# Patient Record
Sex: FEMALE | Race: WHITE | NOT HISPANIC OR LATINO | Employment: FULL TIME | URBAN - METROPOLITAN AREA
[De-identification: names, ages, dates, MRNs, and addresses within clinical notes are randomized per-mention and may not be internally consistent; named-entity substitution may affect disease eponyms.]

---

## 2018-01-17 NOTE — MISCELLANEOUS
Message   Recorded as Task   Date: 09/12/2016 08:29 AM, Created By: Nessa Fox   Task Name: Go to Result   Assigned To: [facility]   Regarding Patient: Hilda Felix, Status: In Progress   Comment:    Viktoria Solisdon - 12 Sep 2016 8:29 AM     TASK CREATED  Patient with essentially normal tissue, but showing possible signs of infection  She should go on Keflex 250 mg 4 times a day for 1 week  In the meantime, she also may consider having an NovaSure ablation  Nessa Cerrato - 12 Sep 2016 12:47 PM     TASK IN PROGRESS        Active Problems    1  Denied: History of Breast self examination education, encounter for   2  Encounter for screening for malignant neoplasm of cervix (V76 2) (Z12 4)   3  Encounter for screening mammogram for malignant neoplasm of breast (V76 12)   (Z12 31)   4  Infection (136 9) (B99 9)   5  Irregular menses (626 4) (N92 6)   6  Mammogram Right Breast Microcalcifications (793 81)   7  Screening for human papillomavirus (HPV) (V73 81) (Z11 51)    Allergies    1  Erythromycin Base TABS   2  Levaquin TABS   3  Tetracyclines    Plan  Infection    · Cephalexin 250 MG Oral Capsule;  Take 1 capsule 4 times a day for 7 days    Signatures   Electronically signed by : Stephen Eason, ; Sep 15 2016 11:28AM EST                       (Author)

## 2018-03-26 ENCOUNTER — TELEPHONE (OUTPATIENT)
Dept: OBGYN CLINIC | Facility: CLINIC | Age: 48
End: 2018-03-26

## 2018-03-27 ENCOUNTER — OFFICE VISIT (OUTPATIENT)
Dept: OBGYN CLINIC | Facility: CLINIC | Age: 48
End: 2018-03-27
Payer: COMMERCIAL

## 2018-03-27 ENCOUNTER — TELEPHONE (OUTPATIENT)
Dept: OBGYN CLINIC | Facility: CLINIC | Age: 48
End: 2018-03-27

## 2018-03-27 VITALS
HEIGHT: 61 IN | DIASTOLIC BLOOD PRESSURE: 58 MMHG | BODY MASS INDEX: 25.3 KG/M2 | SYSTOLIC BLOOD PRESSURE: 106 MMHG | WEIGHT: 134 LBS

## 2018-03-27 DIAGNOSIS — N92.0 MENORRHAGIA WITH REGULAR CYCLE: Primary | ICD-10-CM

## 2018-03-27 DIAGNOSIS — Z12.31 ENCOUNTER FOR SCREENING MAMMOGRAM FOR BREAST CANCER: ICD-10-CM

## 2018-03-27 PROCEDURE — 99213 OFFICE O/P EST LOW 20 MIN: CPT | Performed by: OBSTETRICS & GYNECOLOGY

## 2018-03-27 PROCEDURE — 58100 BIOPSY OF UTERUS LINING: CPT | Performed by: OBSTETRICS & GYNECOLOGY

## 2018-03-27 NOTE — PROGRESS NOTES
Endometrial biopsy  Date/Time: 3/27/2018 12:26 PM  Performed by: Tessie Fonseca by: Cecelia Sepulveda     Consent:     Consent obtained:  Verbal    Consent given by:  Patient    Procedural risks discussed:  Bleeding, infection and possible continued pain    Patient questions answered: yes      Patient agrees, verbalizes understanding, and wants to proceed: yes    Indication:     Indications: Other disorder of menstruation and other abnormal bleeding from female genital tract    Pre-procedure:     Pre-procedure timeout performed: yes    Procedure:     Procedure: endometrial biopsy with Pipelle      A bivalve speculum was placed in the vagina: yes      Cervix cleaned and prepped: yes      A paracervical block was performed: no      An intracervical block was performed: no      The cervix was dilated: no      Uterus sounded: yes      Uterus sound depth (cm):  8    Specimen collected: specimen collected and sent to pathology      Patient tolerated procedure well with no complications: yes       Cayetano Reyna presents with continuous bleeding off and on for the entire month of March  She takes no new medications, denies any pelvic pain, and has no history of polyps or fibroids  The patient consented to an endometrial sampling as performed above  Afterwards an exam revealed an anteverted normal size uterus that is mobile and nontender with no adnexal masses  Assessment/plan:  Menorrhagia with regular cycles  Patient to be scheduled for sonohysterogram and further recommendations based on this and the endometrial sampling

## 2018-04-03 ENCOUNTER — ULTRASOUND (OUTPATIENT)
Dept: OBGYN CLINIC | Facility: CLINIC | Age: 48
End: 2018-04-03
Payer: COMMERCIAL

## 2018-04-03 ENCOUNTER — PROCEDURE VISIT (OUTPATIENT)
Dept: OBGYN CLINIC | Facility: CLINIC | Age: 48
End: 2018-04-03
Payer: COMMERCIAL

## 2018-04-03 VITALS
BODY MASS INDEX: 26.39 KG/M2 | WEIGHT: 139.8 LBS | SYSTOLIC BLOOD PRESSURE: 118 MMHG | HEIGHT: 61 IN | DIASTOLIC BLOOD PRESSURE: 80 MMHG

## 2018-04-03 DIAGNOSIS — N92.0 MENORRHAGIA WITH REGULAR CYCLE: Primary | ICD-10-CM

## 2018-04-03 DIAGNOSIS — N92.6 IRREGULAR BLEEDING: Primary | ICD-10-CM

## 2018-04-03 LAB — SL AMB POCT URINE HCG: NORMAL

## 2018-04-03 PROCEDURE — 76831 ECHO EXAM UTERUS: CPT

## 2018-04-03 PROCEDURE — 58340 CATHETER FOR HYSTEROGRAPHY: CPT

## 2018-04-03 PROCEDURE — 81025 URINE PREGNANCY TEST: CPT

## 2018-04-03 PROCEDURE — 76830 TRANSVAGINAL US NON-OB: CPT

## 2018-04-03 PROCEDURE — 99213 OFFICE O/P EST LOW 20 MIN: CPT

## 2018-04-03 NOTE — PROGRESS NOTES
Sylvia Angelo is seen today following an uneventful sonohysterogram because of irregular bleeding  She did have an endometrial sampling last week, results are still unavailable  She and I did discuss that the findings on ultrasound today were within normal limits, and that barring an abnormality of the biopsy, she had several options available to her  The options included but not limited to observation, medications, or ablation  The patient also was reminded she is overdue for a Pap smear and annual visit and she will schedule this at this time  For now she will probably elect to observe her cycles and she will be notified of the biopsy results when available  I have spent 17 minutes with the patient today in which greater than 50% of this time was spent in counseling/coordination of care regarding irregular bleeding

## 2018-04-03 NOTE — PROGRESS NOTES
AMB US Pelvic Non OB  Date/Time: 4/3/2018 9:21 AM  Performed by: Santos Mckoy by: Robert Pritchard     Procedure details:     Indications: non-obstetric vaginal bleeding      Technique:  Transvaginal US, Non-OB    Position: lithotomy exam    Uterine findings:     Diameter (mm):  59    Length (mm):  111    Width (mm):  70    Endometrial stripe: identified      Endometrium thickness (mm):  8 5  Left ovary findings:     Left ovary:  Visualized    Diameter (mm):  19 1    Length (mm):  29 7    Width (mm):  19 1  Right ovary findings:     Right ovary:  Visualized    Diameter (mm):  23 3    Length (mm):  41 6    Width (mm):  29 5  Other findings:     Free pelvic fluid: not identified      Free peritoneal fluid: not identified    Post-Procedure Details:     Impression:  Anteverted uterus and bilateral ovaries appear within normal limits  The right ovary demonstrates a 7 4WD follicle  No free fluid  Tolerance:   Tolerated well, no immediate complications    Complications: no complications    Sonohysterogram  Date/Time: 4/3/2018 9:25 AM  Performed by: Obdulio Chaudhary by: Robert Pritchard     Consent:     Consent obtained:  Written    Consent given by:  Patient    Patient questions answered: yes      Patient agrees, verbalizes understanding, and wants to proceed: yes    Pre-procedure:     Prepped with: Betadine    Procedure:     Catheter inserted: yes      Uterine cavity distended with saline: yes    Post-procedure:     No complications: no      Post procedure instructions given to patient: yes      Patient tolerated procedure well with no complications: yes    Comments:      Sonohysterogram demonstrates a small anterior calcification within the endometrium, otherwise a smooth appearing endometrium

## 2018-04-04 LAB
PATH REPORT.SITE OF ORIGIN SPEC: NORMAL
PAYMENT PROCEDURE: NORMAL
SL AMB .: NORMAL

## 2018-04-18 ENCOUNTER — ANNUAL EXAM (OUTPATIENT)
Dept: OBGYN CLINIC | Facility: CLINIC | Age: 48
End: 2018-04-18
Payer: COMMERCIAL

## 2018-04-18 VITALS — HEIGHT: 61 IN | WEIGHT: 141.6 LBS | BODY MASS INDEX: 26.73 KG/M2

## 2018-04-18 DIAGNOSIS — Z11.51 SCREENING FOR HPV (HUMAN PAPILLOMAVIRUS): ICD-10-CM

## 2018-04-18 DIAGNOSIS — Z12.4 CERVICAL CANCER SCREENING: ICD-10-CM

## 2018-04-18 DIAGNOSIS — Z01.419 ENCOUNTER FOR GYNECOLOGICAL EXAMINATION WITHOUT ABNORMAL FINDING: Primary | ICD-10-CM

## 2018-04-18 DIAGNOSIS — N92.6 IRREGULAR MENSES: ICD-10-CM

## 2018-04-18 PROCEDURE — 99396 PREV VISIT EST AGE 40-64: CPT | Performed by: OBSTETRICS & GYNECOLOGY

## 2018-04-18 PROCEDURE — G0145 SCR C/V CYTO,THINLAYER,RESCR: HCPCS | Performed by: OBSTETRICS & GYNECOLOGY

## 2018-04-18 PROCEDURE — 87624 HPV HI-RISK TYP POOLED RSLT: CPT | Performed by: OBSTETRICS & GYNECOLOGY

## 2018-04-18 NOTE — PROGRESS NOTES
CC:  Annual exam    HPI: Anna Nichols presents for routine annual visit  The patient is aware of her biopsy results from 2 weeks ago  At present she prefers to simply observe her bleeding, but will call if she has any further questions  Past Medical History:  History reviewed  No pertinent past medical history  Past Surgical History:  Past Surgical History:   Procedure Laterality Date    CARPAL TUNNEL RELEASE  2005    MANDIBLE FRACTURE SURGERY      TOE SURGERY         Past OB/Gyn History:  Menstrual cycles very irregular  Denies any history of sexually transmitted infection  No history of abnormal pap smears  Her last pap smear was over 3 years ago  ALLERGIES:   Allergies   Allergen Reactions    Erythromycin Hives    Levofloxacin     Other      Sneeezing,     Pollen Extract      sneezinig    Tetracycline Other (See Comments)       MEDS: No current outpatient prescriptions on file  Family History:  Family History   Problem Relation Age of Onset    No Known Problems Mother     Cancer Father        Social History:  Social History     Social History    Marital status:      Spouse name: N/A    Number of children: N/A    Years of education: N/A     Occupational History    Not on file  Social History Main Topics    Smoking status: Light Tobacco Smoker    Smokeless tobacco: Never Used    Alcohol use Yes      Comment: SOCIAL DRINKER    Drug use: No    Sexual activity: Yes     Partners: Male     Birth control/ protection: None     Other Topics Concern    Not on file     Social History Narrative    No narrative on file         Review of Systems:  Skin: No rashes or discolorations of any concern  RESP: Denies SOB, no cough  CV: Denies chest pain or palpitations  Breasts: Denies masses, pain, skin changes and nipple discharge  GI: Denies abdominal pain, heartburn, nausea, vomiting, changes in bowel habits     : Denies dysuria, frequency, CVA tenderness, incontinence and hematuria  Genitalia: Denies abnormal vaginal discharge, external lesions, rashes, pelvic pain, pressure, abnormal bleeding  Rectal:  Denies pain, bleeding, hemorrhoids,    Physical Exam:  Ht 5' 1" (1 549 m)   Wt 64 2 kg (141 lb 9 6 oz)   LMP 03/06/2018 (Exact Date)   Breastfeeding? No   BMI 26 76 kg/m²    Gen: The patient was alert and oriented x3, pleasant well-appearing female in no acute distress  Neck:  Unremarkable, no anterior or posterior lymphadenopathy, no thyromegaly  Breasts: Symmetric  No dominant, discrete, fixed  or suspicious masses are noted  No skin or nipple changes  No palpable axillary nodes  Abd:  Soft, nontender, nondistended, no masses or organomegaly  Back:  No CVA tenderness, no tenderness to palpation along spine  Pelvic  Normal appearing external female genitalia, no visible lesions, no rashes  Vagina is free of discharge, normal vaginal epithelium, no abnormal  lesions, no evidence of prolapse anteriorly or posteriorly  Normal appearing cervix, mobile and nontender  A thin prep pap smear was obtained  Uterus is normal size, mobile and, nontender  No palpable adnexal masses or tenderness  No anoperineal lesions  Rectal:  No masses, tenderness, hemorrhoids, or obvious blood  Skin:  No concerning lesions  Extremeties: No edema      Assessment & Plan:   1  Routine annual exam      RTO one year orPRN  2  Encounter for screening mammogram, referral for mammogram given to patient  3   Irregular bleeding, patient will observe for now but will call with any further questions

## 2018-04-20 LAB — HPV RRNA GENITAL QL NAA+PROBE: NORMAL

## 2018-04-23 LAB
LAB AP GYN PRIMARY INTERPRETATION: NORMAL
LAB AP LMP: NORMAL
Lab: NORMAL

## 2019-05-20 ENCOUNTER — ANNUAL EXAM (OUTPATIENT)
Dept: OBGYN CLINIC | Facility: CLINIC | Age: 49
End: 2019-05-20
Payer: COMMERCIAL

## 2019-05-20 VITALS
BODY MASS INDEX: 26.32 KG/M2 | WEIGHT: 139.4 LBS | SYSTOLIC BLOOD PRESSURE: 106 MMHG | HEIGHT: 61 IN | DIASTOLIC BLOOD PRESSURE: 78 MMHG

## 2019-05-20 DIAGNOSIS — Z12.31 BREAST CANCER SCREENING BY MAMMOGRAM: Primary | ICD-10-CM

## 2019-05-20 PROCEDURE — 99396 PREV VISIT EST AGE 40-64: CPT | Performed by: OBSTETRICS & GYNECOLOGY

## 2019-06-04 ENCOUNTER — TELEPHONE (OUTPATIENT)
Dept: OBGYN CLINIC | Facility: CLINIC | Age: 49
End: 2019-06-04

## 2019-11-18 ENCOUNTER — OFFICE VISIT (OUTPATIENT)
Dept: PODIATRY | Facility: CLINIC | Age: 49
End: 2019-11-18
Payer: COMMERCIAL

## 2019-11-18 VITALS
HEART RATE: 78 BPM | BODY MASS INDEX: 26.43 KG/M2 | HEIGHT: 61 IN | SYSTOLIC BLOOD PRESSURE: 124 MMHG | WEIGHT: 140 LBS | DIASTOLIC BLOOD PRESSURE: 80 MMHG

## 2019-11-18 DIAGNOSIS — M20.21 HALLUX RIGIDUS OF RIGHT FOOT: Primary | ICD-10-CM

## 2019-11-18 DIAGNOSIS — M79.671 RIGHT FOOT PAIN: ICD-10-CM

## 2019-11-18 PROCEDURE — 20550 NJX 1 TENDON SHEATH/LIGAMENT: CPT

## 2019-11-18 PROCEDURE — 99203 OFFICE O/P NEW LOW 30 MIN: CPT

## 2019-11-18 RX ORDER — MELOXICAM 15 MG/1
15 TABLET ORAL DAILY
Qty: 30 TABLET | Refills: 0 | Status: SHIPPED | OUTPATIENT
Start: 2019-11-18 | End: 2020-12-18 | Stop reason: HOSPADM

## 2019-11-18 RX ORDER — LIDOCAINE HYDROCHLORIDE 10 MG/ML
1 INJECTION, SOLUTION INFILTRATION; PERINEURAL ONCE
Status: COMPLETED | OUTPATIENT
Start: 2019-11-18 | End: 2019-11-18

## 2019-11-18 RX ORDER — TRIAMCINOLONE ACETONIDE 40 MG/ML
20 INJECTION, SUSPENSION INTRA-ARTICULAR; INTRAMUSCULAR ONCE
Status: COMPLETED | OUTPATIENT
Start: 2019-11-18 | End: 2019-11-18

## 2019-11-18 RX ADMIN — TRIAMCINOLONE ACETONIDE 20 MG: 40 INJECTION, SUSPENSION INTRA-ARTICULAR; INTRAMUSCULAR at 15:49

## 2019-11-18 RX ADMIN — LIDOCAINE HYDROCHLORIDE 1 ML: 10 INJECTION, SOLUTION INFILTRATION; PERINEURAL at 15:49

## 2019-11-18 NOTE — PROGRESS NOTES
Assessment/Plan:    X-rays, two views right foot revealed mild osteoarthritis of the great toe joint  There is slight narrowing of the joint space and a small dorsal spur on the 1st metatarsal   Explained to patient that she is dealing with hallux rigidus  The x-rays finding are that of a mild condition  Discussed conservative and surgical treatment options recommending conservative care for now  Injected right foot distal to tibial sesamoid with 0 5 cc Kenalog 40 and 1 cc 1% xylocaine  Patient placed on meloxicam 15 mg daily for 30 days  She is rescheduled for 4 weeks  No problem-specific Assessment & Plan notes found for this encounter  Diagnoses and all orders for this visit:    Hallux rigidus of right foot  -     XR foot 2 vw right; Future  -     meloxicam (MOBIC) 15 mg tablet; Take 1 tablet (15 mg total) by mouth daily  -     lidocaine (XYLOCAINE) 1 % injection 1 mL  -     triamcinolone acetonide (KENALOG-40) 40 mg/mL injection 20 mg    Right foot pain          Subjective:      Patient ID: Ashok Agrawal is a 57 y o  female  HPI     Patient presents with pain in her right great toe joint  Patient states that this joint has become painful over the past few months  Patient had similar pain in the left great toe joint in 2012 that necessitated an implant arthroplasty  Patient states that the right great toe throbs when walking  Chief area of pain is on the undersurface of the right great toe slightly distal to the tibial sesamoid  Patient has been wearing a gel pad on the 2nd toe to relieve discomfort  This helps to a modest degree  The following portions of the patient's history were reviewed and updated as appropriate: allergies, current medications, past family history, past medical history, past social history, past surgical history and problem list     Review of Systems   Respiratory: Negative  Cardiovascular: Negative  Gastrointestinal: Negative      Musculoskeletal: Negative  Neurological: Negative  Objective:      /80   Pulse 78   Ht 5' 1" (1 549 m)   Wt 63 5 kg (140 lb)   BMI 26 45 kg/m²          Physical Exam   Constitutional: She is oriented to person, place, and time  Cardiovascular: Regular rhythm and intact distal pulses  Musculoskeletal: She exhibits deformity  Right great toe joint range of motion is restricted to 20° of dorsiflexion  Abrupt motion at the 1st MPJ is painful  Pain elicited with palpation at the plantar aspect right foot distal to the tibial sesamoid  Neurological: She is alert and oriented to person, place, and time  Skin: Skin is warm  No rash noted  No erythema  Foot injection     Date/Time 11/18/2019 3:51 PM     Performed by  Olga Lidia Adkins DPM     Authorized by Olga Lidia Adkins DPM      [place] Protocol Consent: Verbal consent obtained  Written consent not obtained    Risks and benefits: risks, benefits and alternatives were discussed  Consent given by: patient  Patient understanding: patient states understanding of the procedure being performed        Site preparation: Isopropyl alcohol    Local anesthesia used: yes     Anesthesia   Local anesthesia used: yes  Local Anesthetic: lidocaine 1% without epinephrine     Procedure Details   Procedure Notes: Injected right foot with 0 5 cc Kenalog 40 along with 1 cc 1% xylocaine

## 2019-12-16 ENCOUNTER — OFFICE VISIT (OUTPATIENT)
Dept: PODIATRY | Facility: CLINIC | Age: 49
End: 2019-12-16
Payer: COMMERCIAL

## 2019-12-16 VITALS
HEIGHT: 61 IN | WEIGHT: 140 LBS | HEART RATE: 81 BPM | SYSTOLIC BLOOD PRESSURE: 126 MMHG | BODY MASS INDEX: 26.43 KG/M2 | DIASTOLIC BLOOD PRESSURE: 84 MMHG

## 2019-12-16 DIAGNOSIS — M20.21 HALLUX RIGIDUS OF RIGHT FOOT: ICD-10-CM

## 2019-12-16 DIAGNOSIS — M25.571 PAIN, JOINT, FOOT, RIGHT: Primary | ICD-10-CM

## 2019-12-16 PROCEDURE — 20550 NJX 1 TENDON SHEATH/LIGAMENT: CPT | Performed by: PODIATRIST

## 2019-12-16 PROCEDURE — 99213 OFFICE O/P EST LOW 20 MIN: CPT | Performed by: PODIATRIST

## 2019-12-16 RX ORDER — LIDOCAINE HYDROCHLORIDE 10 MG/ML
1 INJECTION, SOLUTION INFILTRATION; PERINEURAL ONCE
Status: COMPLETED | OUTPATIENT
Start: 2019-12-16 | End: 2019-12-16

## 2019-12-16 RX ORDER — TRIAMCINOLONE ACETONIDE 40 MG/ML
20 INJECTION, SUSPENSION INTRA-ARTICULAR; INTRAMUSCULAR ONCE
Status: COMPLETED | OUTPATIENT
Start: 2019-12-16 | End: 2019-12-16

## 2019-12-16 RX ADMIN — TRIAMCINOLONE ACETONIDE 20 MG: 40 INJECTION, SUSPENSION INTRA-ARTICULAR; INTRAMUSCULAR at 11:29

## 2019-12-16 RX ADMIN — LIDOCAINE HYDROCHLORIDE 1 ML: 10 INJECTION, SOLUTION INFILTRATION; PERINEURAL at 11:29

## 2019-12-16 NOTE — PROGRESS NOTES
Assessment/Plan:    Reviewed treatment options for hallux rigidus  Explained to patient that the best shoe gear for her is a stiff shoe and 1 that does not have a heel  Unfortunately, patient prefers to wear shoes with a heel  Patient only has 15° of dorsiflexion at the 1st MPJ  Treatment consisted of injecting painful 1st MPJ with 0 5 cc Kenalog 40 along with 1 cc 1% xylocaine  Consider Voltaren gel in the future  Patient trying to avoid surgery  No problem-specific Assessment & Plan notes found for this encounter  Diagnoses and all orders for this visit:    Pain, joint, foot, right  -     lidocaine (XYLOCAINE) 1 % injection 1 mL  -     triamcinolone acetonide (KENALOG-40) 40 mg/mL injection 20 mg    Hallux rigidus of right foot  -     lidocaine (XYLOCAINE) 1 % injection 1 mL  -     triamcinolone acetonide (KENALOG-40) 40 mg/mL injection 20 mg          Subjective:      Patient ID: Ashok Agrawal is a 54 y o  female  HPI     Patient presents for follow-up  Patient was seen approximately 1 month ago and diagnosed with hallux rigidus of the right 1st MPJ  At that time her pain from the deformity was on the plantar aspect of the right 1st MPJ slightly distal to the tibial sesamoid  A cortisone injection was given and patient has had relief with this injection  However, patient now has pain on the dorsal aspect of the joint  The following portions of the patient's history were reviewed and updated as appropriate: allergies, current medications, past family history, past medical history, past social history, past surgical history and problem list     Review of Systems   Gastrointestinal: Negative  Musculoskeletal: Negative  Neurological: Negative  Hematological: Negative  Objective:      /84   Pulse 81   Ht 5' 1" (1 549 m)   Wt 63 5 kg (140 lb)   BMI 26 45 kg/m²          Physical Exam   Cardiovascular: Regular rhythm and intact distal pulses     Musculoskeletal: She exhibits tenderness and deformity  Pain with palpation dorsum right foot at the lateral aspect of the 1st MPJ  This discomfort extends medially to to the dorsal medial spur  No pain with palpation plantar medial aspect right 1st MPJ  First MPJ dorsiflexion limited to 15°  Skin: Skin is warm  No rash noted  No erythema  Foot injection     Date/Time 12/16/2019 11:32 AM     Performed by  Vitaliy Tavarez DPM     Authorized by Vitaliy Tavarez DPM      Universal Protocol Consent: Verbal consent obtained    Risks and benefits: risks, benefits and alternatives were discussed  Consent given by: patient  Patient understanding: patient states understanding of the procedure being performed  Patient identity confirmed: verbally with patient        Site preparation: Isopropyl alcohol    Local anesthesia used: yes     Anesthesia   Local anesthesia used: yes  Local Anesthetic: lidocaine 1% without epinephrine     Sedation   Patient sedated: no       Procedure Details   Procedure Notes: Injected dorsal aspect right 1st MPJ with 0 5 cc Kenalog 40 along with 1 cc 1% xylocaine

## 2020-09-23 ENCOUNTER — HOSPITAL ENCOUNTER (OUTPATIENT)
Dept: MAMMOGRAPHY | Facility: HOSPITAL | Age: 50
Discharge: HOME | End: 2020-09-23
Payer: COMMERCIAL

## 2020-09-23 VITALS — BODY MASS INDEX: 25.49 KG/M2 | WEIGHT: 135 LBS | HEIGHT: 61 IN

## 2020-09-23 DIAGNOSIS — Z12.31 BREAST CANCER SCREENING BY MAMMOGRAM: ICD-10-CM

## 2020-09-23 PROCEDURE — 77067 SCR MAMMO BI INCL CAD: CPT

## 2020-09-23 PROCEDURE — 77063 BREAST TOMOSYNTHESIS BI: CPT

## 2020-10-02 ENCOUNTER — OFFICE VISIT (OUTPATIENT)
Dept: OBGYN CLINIC | Facility: MEDICAL CENTER | Age: 50
End: 2020-10-02
Payer: COMMERCIAL

## 2020-10-02 VITALS
WEIGHT: 139.8 LBS | SYSTOLIC BLOOD PRESSURE: 114 MMHG | HEIGHT: 61 IN | DIASTOLIC BLOOD PRESSURE: 72 MMHG | BODY MASS INDEX: 26.39 KG/M2 | TEMPERATURE: 97.9 F

## 2020-10-02 DIAGNOSIS — Z12.31 ENCOUNTER FOR SCREENING MAMMOGRAM FOR MALIGNANT NEOPLASM OF BREAST: ICD-10-CM

## 2020-10-02 DIAGNOSIS — Z01.419 WOMEN'S ANNUAL ROUTINE GYNECOLOGICAL EXAMINATION: Primary | ICD-10-CM

## 2020-10-02 PROCEDURE — 99396 PREV VISIT EST AGE 40-64: CPT | Performed by: OBSTETRICS & GYNECOLOGY

## 2020-11-10 ENCOUNTER — OFFICE VISIT (OUTPATIENT)
Dept: PODIATRY | Facility: CLINIC | Age: 50
End: 2020-11-10
Payer: COMMERCIAL

## 2020-11-10 VITALS
HEART RATE: 56 BPM | SYSTOLIC BLOOD PRESSURE: 114 MMHG | BODY MASS INDEX: 27.56 KG/M2 | DIASTOLIC BLOOD PRESSURE: 75 MMHG | TEMPERATURE: 96.4 F | HEIGHT: 61 IN | WEIGHT: 146 LBS

## 2020-11-10 DIAGNOSIS — M25.571 PAIN, JOINT, FOOT, RIGHT: Primary | ICD-10-CM

## 2020-11-10 DIAGNOSIS — M20.21 HALLUX RIGIDUS OF RIGHT FOOT: ICD-10-CM

## 2020-11-10 PROCEDURE — 99213 OFFICE O/P EST LOW 20 MIN: CPT | Performed by: PODIATRIST

## 2020-11-10 PROCEDURE — 20550 NJX 1 TENDON SHEATH/LIGAMENT: CPT | Performed by: PODIATRIST

## 2020-11-10 RX ORDER — TRIAMCINOLONE ACETONIDE 40 MG/ML
20 INJECTION, SUSPENSION INTRA-ARTICULAR; INTRAMUSCULAR ONCE
Status: COMPLETED | OUTPATIENT
Start: 2020-11-10 | End: 2020-11-10

## 2020-11-10 RX ORDER — LIDOCAINE HYDROCHLORIDE 10 MG/ML
1 INJECTION, SOLUTION EPIDURAL; INFILTRATION; INTRACAUDAL; PERINEURAL ONCE
Status: COMPLETED | OUTPATIENT
Start: 2020-11-10 | End: 2020-11-10

## 2020-11-10 RX ADMIN — LIDOCAINE HYDROCHLORIDE 1 ML: 10 INJECTION, SOLUTION EPIDURAL; INFILTRATION; INTRACAUDAL; PERINEURAL at 14:15

## 2020-11-10 RX ADMIN — TRIAMCINOLONE ACETONIDE 20 MG: 40 INJECTION, SUSPENSION INTRA-ARTICULAR; INTRAMUSCULAR at 14:15

## 2020-12-02 ENCOUNTER — OFFICE VISIT (OUTPATIENT)
Dept: PODIATRY | Facility: CLINIC | Age: 50
End: 2020-12-02
Payer: COMMERCIAL

## 2020-12-02 VITALS
HEART RATE: 77 BPM | WEIGHT: 144 LBS | BODY MASS INDEX: 27.19 KG/M2 | SYSTOLIC BLOOD PRESSURE: 123 MMHG | DIASTOLIC BLOOD PRESSURE: 73 MMHG | HEIGHT: 61 IN

## 2020-12-02 DIAGNOSIS — M20.21 HALLUX RIGIDUS OF RIGHT FOOT: Primary | ICD-10-CM

## 2020-12-02 PROCEDURE — 99213 OFFICE O/P EST LOW 20 MIN: CPT | Performed by: PODIATRIST

## 2020-12-02 RX ORDER — CEFAZOLIN SODIUM 1 G/50ML
1000 SOLUTION INTRAVENOUS ONCE
Status: CANCELLED | OUTPATIENT
Start: 2020-12-18 | End: 2020-12-02

## 2020-12-17 ENCOUNTER — ANESTHESIA EVENT (OUTPATIENT)
Dept: PERIOP | Facility: HOSPITAL | Age: 50
End: 2020-12-17
Payer: COMMERCIAL

## 2020-12-17 RX ORDER — IBUPROFEN 200 MG
TABLET ORAL EVERY 6 HOURS PRN
COMMUNITY
End: 2020-12-18 | Stop reason: HOSPADM

## 2020-12-17 RX ORDER — MULTIVITAMIN
1 TABLET ORAL DAILY
COMMUNITY
End: 2021-02-25

## 2020-12-18 ENCOUNTER — APPOINTMENT (OUTPATIENT)
Dept: RADIOLOGY | Facility: HOSPITAL | Age: 50
End: 2020-12-18
Payer: COMMERCIAL

## 2020-12-18 ENCOUNTER — HOSPITAL ENCOUNTER (OUTPATIENT)
Facility: HOSPITAL | Age: 50
Setting detail: OUTPATIENT SURGERY
Discharge: HOME | End: 2020-12-18
Attending: PODIATRIST | Admitting: PODIATRIST
Payer: COMMERCIAL

## 2020-12-18 ENCOUNTER — ANESTHESIA (OUTPATIENT)
Dept: PERIOP | Facility: HOSPITAL | Age: 50
End: 2020-12-18
Payer: COMMERCIAL

## 2020-12-18 VITALS — HEART RATE: 65 BPM

## 2020-12-18 VITALS
HEIGHT: 61 IN | RESPIRATION RATE: 18 BRPM | SYSTOLIC BLOOD PRESSURE: 100 MMHG | BODY MASS INDEX: 26.06 KG/M2 | HEART RATE: 67 BPM | WEIGHT: 138 LBS | DIASTOLIC BLOOD PRESSURE: 56 MMHG | OXYGEN SATURATION: 96 % | TEMPERATURE: 98.3 F

## 2020-12-18 DIAGNOSIS — Z98.890 POST-OPERATIVE STATE: Primary | ICD-10-CM

## 2020-12-18 PROBLEM — F17.200 SMOKER: Chronic | Status: ACTIVE | Noted: 2020-12-18

## 2020-12-18 LAB
EXT PREGNANCY TEST URINE: NEGATIVE
EXT. CONTROL: NORMAL

## 2020-12-18 PROCEDURE — 99024 POSTOP FOLLOW-UP VISIT: CPT | Performed by: PODIATRIST

## 2020-12-18 PROCEDURE — 81025 URINE PREGNANCY TEST: CPT | Performed by: ANESTHESIOLOGY

## 2020-12-18 PROCEDURE — C1776 JOINT DEVICE (IMPLANTABLE): HCPCS | Performed by: PODIATRIST

## 2020-12-18 PROCEDURE — 73630 X-RAY EXAM OF FOOT: CPT

## 2020-12-18 PROCEDURE — 28291 CORRJ HALUX RIGDUS W/IMPLT: CPT | Performed by: PODIATRIST

## 2020-12-18 DEVICE — IMPLANTABLE DEVICE
Type: IMPLANTABLE DEVICE | Site: FOOT | Status: FUNCTIONAL
Brand: SWANSON

## 2020-12-18 RX ORDER — CEFAZOLIN SODIUM 1 G/50ML
1000 SOLUTION INTRAVENOUS ONCE
Status: COMPLETED | OUTPATIENT
Start: 2020-12-18 | End: 2020-12-18

## 2020-12-18 RX ORDER — OXYCODONE HYDROCHLORIDE AND ACETAMINOPHEN 5; 325 MG/1; MG/1
1 TABLET ORAL EVERY 4 HOURS PRN
Qty: 30 TABLET | Refills: 0 | Status: SHIPPED | OUTPATIENT
Start: 2020-12-18 | End: 2020-12-28

## 2020-12-18 RX ORDER — PROPOFOL 10 MG/ML
INJECTION, EMULSION INTRAVENOUS AS NEEDED
Status: DISCONTINUED | OUTPATIENT
Start: 2020-12-18 | End: 2020-12-18

## 2020-12-18 RX ORDER — MAGNESIUM HYDROXIDE 1200 MG/15ML
LIQUID ORAL AS NEEDED
Status: DISCONTINUED | OUTPATIENT
Start: 2020-12-18 | End: 2020-12-18 | Stop reason: HOSPADM

## 2020-12-18 RX ORDER — FENTANYL CITRATE/PF 50 MCG/ML
50 SYRINGE (ML) INJECTION
Status: DISCONTINUED | OUTPATIENT
Start: 2020-12-18 | End: 2020-12-18 | Stop reason: HOSPADM

## 2020-12-18 RX ORDER — OXYCODONE HYDROCHLORIDE 5 MG/1
5 TABLET ORAL EVERY 4 HOURS PRN
Status: DISCONTINUED | OUTPATIENT
Start: 2020-12-18 | End: 2020-12-18 | Stop reason: HOSPADM

## 2020-12-18 RX ORDER — DEXAMETHASONE SODIUM PHOSPHATE 4 MG/ML
INJECTION, SOLUTION INTRA-ARTICULAR; INTRALESIONAL; INTRAMUSCULAR; INTRAVENOUS; SOFT TISSUE AS NEEDED
Status: DISCONTINUED | OUTPATIENT
Start: 2020-12-18 | End: 2020-12-18

## 2020-12-18 RX ORDER — SODIUM CHLORIDE 9 MG/ML
125 INJECTION, SOLUTION INTRAVENOUS CONTINUOUS
Status: DISCONTINUED | OUTPATIENT
Start: 2020-12-18 | End: 2020-12-18 | Stop reason: HOSPADM

## 2020-12-18 RX ORDER — BUPIVACAINE HYDROCHLORIDE 5 MG/ML
INJECTION, SOLUTION PERINEURAL AS NEEDED
Status: DISCONTINUED | OUTPATIENT
Start: 2020-12-18 | End: 2020-12-18 | Stop reason: HOSPADM

## 2020-12-18 RX ORDER — IBUPROFEN 600 MG/1
600 TABLET ORAL EVERY 6 HOURS PRN
Qty: 90 TABLET | Refills: 0 | Status: SHIPPED | OUTPATIENT
Start: 2020-12-18 | End: 2021-02-25

## 2020-12-18 RX ORDER — HYDROMORPHONE HCL/PF 1 MG/ML
0.5 SYRINGE (ML) INJECTION
Status: DISCONTINUED | OUTPATIENT
Start: 2020-12-18 | End: 2020-12-18 | Stop reason: HOSPADM

## 2020-12-18 RX ORDER — MEPERIDINE HYDROCHLORIDE 25 MG/ML
12.5 INJECTION INTRAMUSCULAR; INTRAVENOUS; SUBCUTANEOUS ONCE AS NEEDED
Status: DISCONTINUED | OUTPATIENT
Start: 2020-12-18 | End: 2020-12-18 | Stop reason: HOSPADM

## 2020-12-18 RX ORDER — MIDAZOLAM HYDROCHLORIDE 2 MG/2ML
INJECTION, SOLUTION INTRAMUSCULAR; INTRAVENOUS AS NEEDED
Status: DISCONTINUED | OUTPATIENT
Start: 2020-12-18 | End: 2020-12-18

## 2020-12-18 RX ORDER — FENTANYL CITRATE 50 UG/ML
INJECTION, SOLUTION INTRAMUSCULAR; INTRAVENOUS AS NEEDED
Status: DISCONTINUED | OUTPATIENT
Start: 2020-12-18 | End: 2020-12-18

## 2020-12-18 RX ORDER — ONDANSETRON 2 MG/ML
4 INJECTION INTRAMUSCULAR; INTRAVENOUS ONCE AS NEEDED
Status: DISCONTINUED | OUTPATIENT
Start: 2020-12-18 | End: 2020-12-18 | Stop reason: HOSPADM

## 2020-12-18 RX ORDER — ONDANSETRON 2 MG/ML
INJECTION INTRAMUSCULAR; INTRAVENOUS AS NEEDED
Status: DISCONTINUED | OUTPATIENT
Start: 2020-12-18 | End: 2020-12-18

## 2020-12-18 RX ORDER — LIDOCAINE HYDROCHLORIDE 20 MG/ML
INJECTION, SOLUTION EPIDURAL; INFILTRATION; INTRACAUDAL; PERINEURAL AS NEEDED
Status: DISCONTINUED | OUTPATIENT
Start: 2020-12-18 | End: 2020-12-18

## 2020-12-18 RX ADMIN — FENTANYL CITRATE 100 MCG: 50 INJECTION, SOLUTION INTRAMUSCULAR; INTRAVENOUS at 11:08

## 2020-12-18 RX ADMIN — SODIUM CHLORIDE: 0.9 INJECTION, SOLUTION INTRAVENOUS at 12:01

## 2020-12-18 RX ADMIN — PHENYLEPHRINE HYDROCHLORIDE 100 MCG: 10 INJECTION INTRAVENOUS at 12:07

## 2020-12-18 RX ADMIN — LIDOCAINE HYDROCHLORIDE 100 MG: 20 INJECTION, SOLUTION EPIDURAL; INFILTRATION; INTRACAUDAL; PERINEURAL at 11:08

## 2020-12-18 RX ADMIN — PHENYLEPHRINE HYDROCHLORIDE 100 MCG: 10 INJECTION INTRAVENOUS at 11:58

## 2020-12-18 RX ADMIN — HYDROMORPHONE HYDROCHLORIDE 0.5 MG: 1 INJECTION, SOLUTION INTRAMUSCULAR; INTRAVENOUS; SUBCUTANEOUS at 12:53

## 2020-12-18 RX ADMIN — FENTANYL CITRATE 50 MCG: 50 INJECTION INTRAMUSCULAR; INTRAVENOUS at 12:38

## 2020-12-18 RX ADMIN — CEFAZOLIN SODIUM 1000 MG: 1 SOLUTION INTRAVENOUS at 10:55

## 2020-12-18 RX ADMIN — HYDROMORPHONE HYDROCHLORIDE 0.5 MG: 1 INJECTION, SOLUTION INTRAMUSCULAR; INTRAVENOUS; SUBCUTANEOUS at 12:44

## 2020-12-18 RX ADMIN — MIDAZOLAM 2 MG: 1 INJECTION INTRAMUSCULAR; INTRAVENOUS at 11:06

## 2020-12-18 RX ADMIN — ONDANSETRON 4 MG: 2 INJECTION INTRAMUSCULAR; INTRAVENOUS at 11:11

## 2020-12-18 RX ADMIN — PROPOFOL 200 MG: 10 INJECTION, EMULSION INTRAVENOUS at 11:08

## 2020-12-18 RX ADMIN — DEXAMETHASONE SODIUM PHOSPHATE 4 MG: 4 INJECTION INTRA-ARTICULAR; INTRALESIONAL; INTRAMUSCULAR; INTRAVENOUS; SOFT TISSUE at 11:11

## 2020-12-18 RX ADMIN — SODIUM CHLORIDE 125 ML/HR: 0.9 INJECTION, SOLUTION INTRAVENOUS at 10:29

## 2020-12-18 RX ADMIN — FENTANYL CITRATE 50 MCG: 50 INJECTION INTRAMUSCULAR; INTRAVENOUS at 12:31

## 2020-12-18 RX ADMIN — FENTANYL CITRATE 100 MCG: 50 INJECTION, SOLUTION INTRAMUSCULAR; INTRAVENOUS at 11:26

## 2020-12-23 ENCOUNTER — OFFICE VISIT (OUTPATIENT)
Dept: PODIATRY | Facility: CLINIC | Age: 50
End: 2020-12-23

## 2020-12-23 VITALS
SYSTOLIC BLOOD PRESSURE: 128 MMHG | HEART RATE: 90 BPM | BODY MASS INDEX: 27.03 KG/M2 | DIASTOLIC BLOOD PRESSURE: 86 MMHG | HEIGHT: 61 IN | WEIGHT: 143.2 LBS

## 2020-12-23 DIAGNOSIS — M20.21 HALLUX RIGIDUS OF RIGHT FOOT: Primary | ICD-10-CM

## 2020-12-23 PROCEDURE — 99024 POSTOP FOLLOW-UP VISIT: CPT | Performed by: PODIATRIST

## 2020-12-31 ENCOUNTER — OFFICE VISIT (OUTPATIENT)
Dept: PODIATRY | Facility: CLINIC | Age: 50
End: 2020-12-31

## 2020-12-31 VITALS — HEIGHT: 61 IN | WEIGHT: 143 LBS | BODY MASS INDEX: 27 KG/M2

## 2020-12-31 DIAGNOSIS — M20.21 HALLUX RIGIDUS OF RIGHT FOOT: Primary | ICD-10-CM

## 2020-12-31 PROCEDURE — 99024 POSTOP FOLLOW-UP VISIT: CPT | Performed by: PODIATRIST

## 2021-01-21 ENCOUNTER — OFFICE VISIT (OUTPATIENT)
Dept: PODIATRY | Facility: CLINIC | Age: 51
End: 2021-01-21

## 2021-01-21 VITALS
WEIGHT: 141.8 LBS | BODY MASS INDEX: 26.77 KG/M2 | DIASTOLIC BLOOD PRESSURE: 57 MMHG | HEART RATE: 76 BPM | SYSTOLIC BLOOD PRESSURE: 109 MMHG | HEIGHT: 61 IN

## 2021-01-21 DIAGNOSIS — M20.21 HALLUX RIGIDUS OF RIGHT FOOT: Primary | ICD-10-CM

## 2021-01-21 PROCEDURE — 99024 POSTOP FOLLOW-UP VISIT: CPT | Performed by: PODIATRIST

## 2021-01-21 NOTE — PROGRESS NOTES
Patient presents approximately 5 weeks post implant arthroplasty right 1st MPJ  Patient is wearing a running shoe without difficulty  She has mild discomfort primarily along the incision line  Range of motion at 1st MPJ is approximately 30° of dorsiflexion but pain free  Patient advised utilize Mederma cream or vitamin-E on her scar  It is anticipated that this discomfort will resolve over the next few weeks  Patient told that she may try other shoes in 1 week  She may begin an elliptical at 8 weeks but should hold off on treadmill until 3 months postop  She is rescheduled in 6 weeks

## 2021-01-29 ENCOUNTER — TELEPHONE (OUTPATIENT)
Dept: PODIATRY | Facility: CLINIC | Age: 51
End: 2021-01-29

## 2021-01-29 NOTE — TELEPHONE ENCOUNTER
Patient sees Dr Barbara James  She is calling stating that her insurance is denying the claim for her toe surgery  She just spoke with [employer] and is asking to speak to the surgery scheduler

## 2021-02-25 ENCOUNTER — APPOINTMENT (EMERGENCY)
Dept: CT IMAGING | Facility: HOSPITAL | Age: 51
End: 2021-02-25
Payer: COMMERCIAL

## 2021-02-25 ENCOUNTER — HOSPITAL ENCOUNTER (OUTPATIENT)
Facility: HOSPITAL | Age: 51
Setting detail: OBSERVATION
Discharge: HOME | End: 2021-02-26
Attending: EMERGENCY MEDICINE | Admitting: HOSPITALIST
Payer: COMMERCIAL

## 2021-02-25 ENCOUNTER — APPOINTMENT (OUTPATIENT)
Dept: ULTRASOUND IMAGING | Facility: HOSPITAL | Age: 51
End: 2021-02-25
Payer: COMMERCIAL

## 2021-02-25 DIAGNOSIS — R11.2 NAUSEA AND VOMITING: ICD-10-CM

## 2021-02-25 DIAGNOSIS — R10.9 RIGHT FLANK PAIN: Primary | ICD-10-CM

## 2021-02-25 DIAGNOSIS — R52 INTRACTABLE PAIN: ICD-10-CM

## 2021-02-25 PROBLEM — D72.829 LEUKOCYTOSIS: Status: ACTIVE | Noted: 2021-02-25

## 2021-02-25 LAB
ALBUMIN SERPL BCP-MCNC: 3.6 G/DL (ref 3.5–5)
ALP SERPL-CCNC: 91 U/L (ref 46–116)
ALT SERPL W P-5'-P-CCNC: 20 U/L (ref 12–78)
ANION GAP SERPL CALCULATED.3IONS-SCNC: 9 MMOL/L (ref 4–13)
AST SERPL W P-5'-P-CCNC: 10 U/L (ref 5–45)
BASOPHILS # BLD AUTO: 0.07 THOUSANDS/ΜL (ref 0–0.1)
BASOPHILS NFR BLD AUTO: 1 % (ref 0–1)
BILIRUB SERPL-MCNC: 0.32 MG/DL (ref 0.2–1)
BILIRUB UR QL STRIP: NEGATIVE
BILIRUB UR QL STRIP: NEGATIVE
BUN SERPL-MCNC: 14 MG/DL (ref 5–25)
CALCIUM SERPL-MCNC: 9.1 MG/DL (ref 8.3–10.1)
CHLORIDE SERPL-SCNC: 107 MMOL/L (ref 100–108)
CLARITY UR: ABNORMAL
CLARITY UR: CLEAR
CO2 SERPL-SCNC: 26 MMOL/L (ref 21–32)
COLOR UR: YELLOW
COLOR UR: YELLOW
CREAT SERPL-MCNC: 0.75 MG/DL (ref 0.6–1.3)
EOSINOPHIL # BLD AUTO: 0.1 THOUSAND/ΜL (ref 0–0.61)
EOSINOPHIL NFR BLD AUTO: 1 % (ref 0–6)
ERYTHROCYTE [DISTWIDTH] IN BLOOD BY AUTOMATED COUNT: 13.5 % (ref 11.6–15.1)
EXT PREG TEST URINE: NEGATIVE
EXT. CONTROL ED NAV: NORMAL
GFR SERPL CREATININE-BSD FRML MDRD: 95 ML/MIN/1.73SQ M
GLUCOSE SERPL-MCNC: 144 MG/DL (ref 65–140)
GLUCOSE UR STRIP-MCNC: NEGATIVE MG/DL
GLUCOSE UR STRIP-MCNC: NEGATIVE MG/DL
HCT VFR BLD AUTO: 44.7 % (ref 34.8–46.1)
HGB BLD-MCNC: 15 G/DL (ref 11.5–15.4)
HGB UR QL STRIP.AUTO: NEGATIVE
HGB UR QL STRIP.AUTO: NEGATIVE
IMM GRANULOCYTES # BLD AUTO: 0.06 THOUSAND/UL (ref 0–0.2)
IMM GRANULOCYTES NFR BLD AUTO: 0 % (ref 0–2)
KETONES UR STRIP-MCNC: ABNORMAL MG/DL
KETONES UR STRIP-MCNC: ABNORMAL MG/DL
LACTATE SERPL-SCNC: 0.9 MMOL/L (ref 0.5–2)
LEUKOCYTE ESTERASE UR QL STRIP: NEGATIVE
LEUKOCYTE ESTERASE UR QL STRIP: NEGATIVE
LIPASE SERPL-CCNC: 116 U/L (ref 73–393)
LYMPHOCYTES # BLD AUTO: 1.58 THOUSANDS/ΜL (ref 0.6–4.47)
LYMPHOCYTES NFR BLD AUTO: 11 % (ref 14–44)
MCH RBC QN AUTO: 31.4 PG (ref 26.8–34.3)
MCHC RBC AUTO-ENTMCNC: 33.6 G/DL (ref 31.4–37.4)
MCV RBC AUTO: 94 FL (ref 82–98)
MONOCYTES # BLD AUTO: 1.02 THOUSAND/ΜL (ref 0.17–1.22)
MONOCYTES NFR BLD AUTO: 7 % (ref 4–12)
NEUTROPHILS # BLD AUTO: 11.3 THOUSANDS/ΜL (ref 1.85–7.62)
NEUTS SEG NFR BLD AUTO: 80 % (ref 43–75)
NITRITE UR QL STRIP: NEGATIVE
NITRITE UR QL STRIP: NEGATIVE
NRBC BLD AUTO-RTO: 0 /100 WBCS
PH UR STRIP.AUTO: 5.5 [PH]
PH UR STRIP.AUTO: 6 [PH] (ref 4.5–8)
PLATELET # BLD AUTO: 275 THOUSANDS/UL (ref 149–390)
PMV BLD AUTO: 10.7 FL (ref 8.9–12.7)
POTASSIUM SERPL-SCNC: 3.8 MMOL/L (ref 3.5–5.3)
PROT SERPL-MCNC: 6.9 G/DL (ref 6.4–8.2)
PROT UR STRIP-MCNC: NEGATIVE MG/DL
PROT UR STRIP-MCNC: NEGATIVE MG/DL
RBC # BLD AUTO: 4.77 MILLION/UL (ref 3.81–5.12)
SODIUM SERPL-SCNC: 142 MMOL/L (ref 136–145)
SP GR UR STRIP.AUTO: >=1.03 (ref 1–1.03)
SP GR UR STRIP.AUTO: >=1.03 (ref 1–1.03)
UROBILINOGEN UR QL STRIP.AUTO: 0.2 E.U./DL
UROBILINOGEN UR QL STRIP.AUTO: 0.2 E.U./DL
WBC # BLD AUTO: 14.13 THOUSAND/UL (ref 4.31–10.16)

## 2021-02-25 PROCEDURE — 83690 ASSAY OF LIPASE: CPT | Performed by: PHYSICIAN ASSISTANT

## 2021-02-25 PROCEDURE — 74177 CT ABD & PELVIS W/CONTRAST: CPT

## 2021-02-25 PROCEDURE — 36415 COLL VENOUS BLD VENIPUNCTURE: CPT | Performed by: EMERGENCY MEDICINE

## 2021-02-25 PROCEDURE — 71275 CT ANGIOGRAPHY CHEST: CPT

## 2021-02-25 PROCEDURE — NC001 PR NO CHARGE: Performed by: SURGERY

## 2021-02-25 PROCEDURE — 96374 THER/PROPH/DIAG INJ IV PUSH: CPT

## 2021-02-25 PROCEDURE — 96376 TX/PRO/DX INJ SAME DRUG ADON: CPT

## 2021-02-25 PROCEDURE — 99285 EMERGENCY DEPT VISIT HI MDM: CPT

## 2021-02-25 PROCEDURE — 81003 URINALYSIS AUTO W/O SCOPE: CPT

## 2021-02-25 PROCEDURE — 96361 HYDRATE IV INFUSION ADD-ON: CPT

## 2021-02-25 PROCEDURE — G1004 CDSM NDSC: HCPCS

## 2021-02-25 PROCEDURE — 74176 CT ABD & PELVIS W/O CONTRAST: CPT

## 2021-02-25 PROCEDURE — 81025 URINE PREGNANCY TEST: CPT | Performed by: EMERGENCY MEDICINE

## 2021-02-25 PROCEDURE — 76705 ECHO EXAM OF ABDOMEN: CPT

## 2021-02-25 PROCEDURE — 99220 PR INITIAL OBSERVATION CARE/DAY 70 MINUTES: CPT | Performed by: HOSPITALIST

## 2021-02-25 PROCEDURE — 80053 COMPREHEN METABOLIC PANEL: CPT | Performed by: EMERGENCY MEDICINE

## 2021-02-25 PROCEDURE — 99285 EMERGENCY DEPT VISIT HI MDM: CPT | Performed by: EMERGENCY MEDICINE

## 2021-02-25 PROCEDURE — 83605 ASSAY OF LACTIC ACID: CPT | Performed by: HOSPITALIST

## 2021-02-25 PROCEDURE — 81003 URINALYSIS AUTO W/O SCOPE: CPT | Performed by: EMERGENCY MEDICINE

## 2021-02-25 PROCEDURE — 85025 COMPLETE CBC W/AUTO DIFF WBC: CPT | Performed by: EMERGENCY MEDICINE

## 2021-02-25 PROCEDURE — 96375 TX/PRO/DX INJ NEW DRUG ADDON: CPT

## 2021-02-25 RX ORDER — OXYCODONE HYDROCHLORIDE 5 MG/1
5 TABLET ORAL EVERY 4 HOURS PRN
Status: DISCONTINUED | OUTPATIENT
Start: 2021-02-25 | End: 2021-02-26 | Stop reason: HOSPADM

## 2021-02-25 RX ORDER — KETOROLAC TROMETHAMINE 30 MG/ML
30 INJECTION, SOLUTION INTRAMUSCULAR; INTRAVENOUS EVERY 6 HOURS PRN
Status: DISPENSED | OUTPATIENT
Start: 2021-02-25 | End: 2021-02-26

## 2021-02-25 RX ORDER — NICOTINE 21 MG/24HR
1 PATCH, TRANSDERMAL 24 HOURS TRANSDERMAL DAILY
Status: DISCONTINUED | OUTPATIENT
Start: 2021-02-25 | End: 2021-02-26 | Stop reason: HOSPADM

## 2021-02-25 RX ORDER — KETOROLAC TROMETHAMINE 30 MG/ML
15 INJECTION, SOLUTION INTRAMUSCULAR; INTRAVENOUS ONCE
Status: COMPLETED | OUTPATIENT
Start: 2021-02-25 | End: 2021-02-25

## 2021-02-25 RX ORDER — HYDROMORPHONE HCL/PF 1 MG/ML
0.5 SYRINGE (ML) INJECTION ONCE
Status: COMPLETED | OUTPATIENT
Start: 2021-02-25 | End: 2021-02-25

## 2021-02-25 RX ORDER — ONDANSETRON 2 MG/ML
4 INJECTION INTRAMUSCULAR; INTRAVENOUS ONCE
Status: COMPLETED | OUTPATIENT
Start: 2021-02-25 | End: 2021-02-25

## 2021-02-25 RX ORDER — ACETAMINOPHEN 325 MG/1
650 TABLET ORAL ONCE
Status: COMPLETED | OUTPATIENT
Start: 2021-02-25 | End: 2021-02-25

## 2021-02-25 RX ORDER — FENTANYL CITRATE 50 UG/ML
25 INJECTION, SOLUTION INTRAMUSCULAR; INTRAVENOUS ONCE
Status: COMPLETED | OUTPATIENT
Start: 2021-02-25 | End: 2021-02-25

## 2021-02-25 RX ORDER — METHOCARBAMOL 500 MG/1
1000 TABLET, FILM COATED ORAL EVERY 6 HOURS SCHEDULED
Status: DISCONTINUED | OUTPATIENT
Start: 2021-02-25 | End: 2021-02-26 | Stop reason: HOSPADM

## 2021-02-25 RX ORDER — MORPHINE SULFATE 4 MG/ML
6 INJECTION, SOLUTION INTRAMUSCULAR; INTRAVENOUS ONCE
Status: COMPLETED | OUTPATIENT
Start: 2021-02-25 | End: 2021-02-25

## 2021-02-25 RX ORDER — DIAZEPAM 5 MG/ML
5 INJECTION, SOLUTION INTRAMUSCULAR; INTRAVENOUS ONCE
Status: COMPLETED | OUTPATIENT
Start: 2021-02-25 | End: 2021-02-25

## 2021-02-25 RX ORDER — ONDANSETRON 2 MG/ML
4 INJECTION INTRAMUSCULAR; INTRAVENOUS EVERY 6 HOURS PRN
Status: DISCONTINUED | OUTPATIENT
Start: 2021-02-25 | End: 2021-02-26 | Stop reason: HOSPADM

## 2021-02-25 RX ORDER — SODIUM CHLORIDE 9 MG/ML
75 INJECTION, SOLUTION INTRAVENOUS CONTINUOUS
Status: DISPENSED | OUTPATIENT
Start: 2021-02-25 | End: 2021-02-26

## 2021-02-25 RX ORDER — ACETAMINOPHEN 325 MG/1
650 TABLET ORAL EVERY 6 HOURS PRN
Status: DISCONTINUED | OUTPATIENT
Start: 2021-02-25 | End: 2021-02-26 | Stop reason: HOSPADM

## 2021-02-25 RX ADMIN — METHOCARBAMOL 1000 MG: 500 TABLET ORAL at 23:19

## 2021-02-25 RX ADMIN — KETOROLAC TROMETHAMINE 15 MG: 30 INJECTION, SOLUTION INTRAMUSCULAR at 08:24

## 2021-02-25 RX ADMIN — MORPHINE SULFATE 4 MG: 4 INJECTION INTRAVENOUS at 09:06

## 2021-02-25 RX ADMIN — DIAZEPAM 5 MG: 10 INJECTION, SOLUTION INTRAMUSCULAR; INTRAVENOUS at 14:54

## 2021-02-25 RX ADMIN — IOHEXOL 100 ML: 350 INJECTION, SOLUTION INTRAVENOUS at 13:06

## 2021-02-25 RX ADMIN — OXYCODONE HYDROCHLORIDE 5 MG: 5 TABLET ORAL at 23:19

## 2021-02-25 RX ADMIN — SODIUM CHLORIDE 1000 ML: 0.9 INJECTION, SOLUTION INTRAVENOUS at 11:08

## 2021-02-25 RX ADMIN — KETOROLAC TROMETHAMINE 30 MG: 30 INJECTION, SOLUTION INTRAMUSCULAR; INTRAVENOUS at 17:12

## 2021-02-25 RX ADMIN — METHOCARBAMOL 1000 MG: 500 TABLET ORAL at 14:54

## 2021-02-25 RX ADMIN — SODIUM CHLORIDE 1000 ML: 0.9 INJECTION, SOLUTION INTRAVENOUS at 08:22

## 2021-02-25 RX ADMIN — KETOROLAC TROMETHAMINE 15 MG: 30 INJECTION, SOLUTION INTRAMUSCULAR at 11:33

## 2021-02-25 RX ADMIN — ACETAMINOPHEN 650 MG: 325 TABLET ORAL at 13:16

## 2021-02-25 RX ADMIN — ONDANSETRON 4 MG: 2 INJECTION INTRAMUSCULAR; INTRAVENOUS at 08:23

## 2021-02-25 RX ADMIN — HYDROMORPHONE HYDROCHLORIDE 0.5 MG: 1 INJECTION, SOLUTION INTRAMUSCULAR; INTRAVENOUS; SUBCUTANEOUS at 12:10

## 2021-02-25 RX ADMIN — FENTANYL CITRATE 25 MCG: 50 INJECTION INTRAMUSCULAR; INTRAVENOUS at 13:18

## 2021-02-25 RX ADMIN — OXYCODONE HYDROCHLORIDE 5 MG: 5 TABLET ORAL at 17:59

## 2021-02-25 RX ADMIN — METHOCARBAMOL 1000 MG: 500 TABLET ORAL at 17:59

## 2021-02-25 RX ADMIN — SODIUM CHLORIDE 75 ML/HR: 0.9 INJECTION, SOLUTION INTRAVENOUS at 14:51

## 2021-02-25 RX ADMIN — ACETAMINOPHEN 650 MG: 325 TABLET ORAL at 17:12

## 2021-02-25 NOTE — ED PROVIDER NOTES
History  Chief Complaint   Patient presents with    Flank Pain     pt c/o of right flank pain that radiates to RUQ  pt states the pain started this morning at 6:00  pt denies cp/sob  pt c/o n/v but denies diarrhea  pt vomiting on arrival     52 y o  F p/w right flank pain x 2h  Pt states she had a sudden pain in her right flank that wrapped to the right side of her abdomen  Sharp, shooting warm pain  Associated with N/V  Denies F/C, diarrhea, urinary complaints, h/o kidney stones  History provided by:  Patient   used: No    Flank Pain  Pain location:  R flank  Pain quality: sharp and shooting    Pain radiation: Right side of abdomen    Onset quality:  Sudden  Duration:  2 hours  Chronicity:  New  Context: not previous surgeries    Relieved by:  None tried  Worsened by:  Nothing  Ineffective treatments:  None tried  Associated symptoms: nausea and vomiting    Associated symptoms: no chills, no cough, no diarrhea, no dysuria, no fever and no hematuria        None       Past Medical History:   Diagnosis Date    Arthritis     Gestational diabetes     Hyperlipidemia     Migraine     Seasonal allergies        Past Surgical History:   Procedure Laterality Date    BREAST BIOPSY Right 2016    benign    CARPAL TUNNEL RELEASE Right 2005    MANDIBLE FRACTURE SURGERY      jaw surgery    NM HALLUX RIGIDUS W/CHEILECTOMY 1ST MP JT W/IMPLT Right 12/18/2020    Procedure: BUNIONECTOMY WITH IMPLANT;  Surgeon: Tanja Lerner DPM;  Location: [facility];  Service: Podiatry    TOE SURGERY Left     TONSILLECTOMY         Family History   Problem Relation Age of Onset    Hypothyroidism Mother     Cancer Father     Prostate cancer Father     Cancer Family     Heart disease Family     Hyperlipidemia Family         pure    No Known Problems Maternal Grandmother     No Known Problems Paternal Grandmother     No Known Problems Maternal Aunt     No Known Problems Paternal Aunt     No Known Problems Paternal Aunt     No Known Problems Paternal Aunt      I have reviewed and agree with the history as documented  E-Cigarette/Vaping    E-Cigarette Use Never User      E-Cigarette/Vaping Substances    Nicotine No     THC No     CBD No     Flavoring No     Other No     Unknown No      Social History     Tobacco Use    Smoking status: Heavy Tobacco Smoker     Packs/day: 0 50     Types: Cigarettes    Smokeless tobacco: Never Used    Tobacco comment: smoked cigarette 12/18   Substance Use Topics    Alcohol use: Yes     Frequency: Monthly or less     Drinks per session: 1 or 2    Drug use: No       Review of Systems   Constitutional: Negative for chills and fever  Respiratory: Negative for cough  Gastrointestinal: Positive for abdominal pain (Pain radiates to right side), nausea and vomiting  Negative for diarrhea and rectal pain  Genitourinary: Positive for flank pain (Right)  Negative for dysuria, frequency, hematuria and urgency  Skin: Negative for pallor and rash  All other systems reviewed and are negative  Physical Exam  Physical Exam  Vitals signs and nursing note reviewed  Constitutional:       General: She is in acute distress (Appears uncomfortable)  Appearance: Normal appearance  She is well-developed  She is not ill-appearing, toxic-appearing or diaphoretic  HENT:      Head: Normocephalic and atraumatic  Eyes:      General: No scleral icterus  Neck:      Musculoskeletal: Normal range of motion  Vascular: No JVD  Trachea: Trachea normal    Cardiovascular:      Rate and Rhythm: Normal rate and regular rhythm  Heart sounds: Normal heart sounds  No murmur  No friction rub  Pulmonary:      Effort: Pulmonary effort is normal  No accessory muscle usage or respiratory distress  Breath sounds: Normal breath sounds  No stridor  No wheezing, rhonchi or rales  Abdominal:      General: There is no distension  Palpations: Abdomen is soft   Abdomen is not rigid  There is no mass  Tenderness: There is no abdominal tenderness  There is no guarding or rebound  Negative signs include Baptiste's sign and McBurney's sign  Skin:     General: Skin is warm and dry  Coloration: Skin is not pale  Findings: No rash  Neurological:      Mental Status: She is alert  GCS: GCS eye subscore is 4  GCS verbal subscore is 5  GCS motor subscore is 6     Psychiatric:         Behavior: Behavior normal          Vital Signs  ED Triage Vitals [02/25/21 0802]   Temperature Pulse Respirations Blood Pressure SpO2   97 5 °F (36 4 °C) 68 18 146/82 100 %      Temp Source Heart Rate Source Patient Position - Orthostatic VS BP Location FiO2 (%)   Oral Monitor Lying Right arm --      Pain Score       9           Vitals:    02/25/21 0802 02/25/21 1111 02/25/21 1320   BP: 146/82 133/70 128/71   Pulse: 68 61 63   Patient Position - Orthostatic VS: Lying Lying Lying         Visual Acuity  Visual Acuity      Most Recent Value   L Pupil Size (mm)  3   R Pupil Size (mm)  3   L Pupil Shape  Round   R Pupil Shape  Round          ED Medications  Medications   sodium chloride 0 9 % infusion (has no administration in time range)   acetaminophen (TYLENOL) tablet 650 mg (has no administration in time range)   ondansetron (ZOFRAN) injection 4 mg (has no administration in time range)   nicotine (NICODERM CQ) 21 mg/24 hr TD 24 hr patch 1 patch (1 patch Transdermal Not Given 2/25/21 1447)   methocarbamol (ROBAXIN) tablet 1,000 mg (has no administration in time range)   diazepam (VALIUM) injection 5 mg (has no administration in time range)   ketorolac (TORADOL) injection 30 mg (has no administration in time range)   sodium chloride 0 9 % bolus 1,000 mL (0 mL Intravenous Stopped 2/25/21 1012)   ketorolac (TORADOL) injection 15 mg (15 mg Intravenous Given 2/25/21 0824)   ondansetron (ZOFRAN) injection 4 mg (4 mg Intravenous Given 2/25/21 0823)   morphine (PF) 4 mg/mL injection 6 mg (4 mg Intravenous Given 2/25/21 0906)   sodium chloride 0 9 % bolus 1,000 mL (0 mL Intravenous Stopped 2/25/21 1425)   ketorolac (TORADOL) injection 15 mg (15 mg Intravenous Given 2/25/21 1133)   HYDROmorphone (DILAUDID) injection 0 5 mg (0 5 mg Intravenous Given 2/25/21 1210)   fentanyl citrate (PF) 100 MCG/2ML 25 mcg (25 mcg Intravenous Given 2/25/21 1318)   acetaminophen (TYLENOL) tablet 650 mg (650 mg Oral Given 2/25/21 1316)   iohexol (OMNIPAQUE) 350 MG/ML injection (SINGLE-DOSE) 100 mL (100 mL Intravenous Given 2/25/21 1306)       Diagnostic Studies  Results Reviewed     Procedure Component Value Units Date/Time    Lipase [[id number]]  (Normal) Collected: 02/25/21 0833    Lab Status: Final result Specimen: Blood from Arm, Left Updated: 02/25/21 1445     Lipase 116 u/L     UA (URINE) with reflex to Scope [[id number]]  (Abnormal) Collected: 02/25/21 1211    Lab Status: Final result Specimen: Urine, Clean Catch Updated: 02/25/21 1222     Color, UA Yellow     Clarity, UA Cloudy     Specific Gravity, UA >=1 030     pH, UA 5 5     Leukocytes, UA Negative     Nitrite, UA Negative     Protein, UA Negative mg/dl      Glucose, UA Negative mg/dl      Ketones, UA Trace mg/dl      Urobilinogen, UA 0 2 E U /dl      Bilirubin, UA Negative     Blood, UA Negative    POCT pregnancy, urine [[id number]]  (Normal) Resulted: 02/25/21 1210    Lab Status: Final result Updated: 02/25/21 1210     EXT PREG TEST UR (Ref: Negative) negative     Control valid    Urine Macroscopic, POC [[id number]]  (Abnormal) Collected: 02/25/21 1206    Lab Status: Final result Specimen: Urine Updated: 02/25/21 1207     Color, UA Yellow     Clarity, UA Clear     pH, UA 6 0     Leukocytes, UA Negative     Nitrite, UA Negative     Protein, UA Negative mg/dl      Glucose, UA Negative mg/dl      Ketones, UA Trace mg/dl      Urobilinogen, UA 0 2 E U /dl      Bilirubin, UA Negative     Blood, UA Negative     Specific Gravity, UA >=1 030    Narrative:      CLINITEK RESULT Comprehensive metabolic panel [[id number]]  (Abnormal) Collected: 02/25/21 0833    Lab Status: Final result Specimen: Blood from Arm, Left Updated: 02/25/21 0902     Sodium 142 mmol/L      Potassium 3 8 mmol/L      Chloride 107 mmol/L      CO2 26 mmol/L      ANION GAP 9 mmol/L      BUN 14 mg/dL      Creatinine 0 75 mg/dL      Glucose 144 mg/dL      Calcium 9 1 mg/dL      AST 10 U/L      ALT 20 U/L      Alkaline Phosphatase 91 U/L      Total Protein 6 9 g/dL      Albumin 3 6 g/dL      Total Bilirubin 0 32 mg/dL      eGFR 95 ml/min/1 73sq m     Narrative:      National Kidney Disease Foundation guidelines for Chronic Kidney Disease (CKD):     Stage 1 with normal or high GFR (GFR > 90 mL/min/1 73 square meters)    Stage 2 Mild CKD (GFR = 60-89 mL/min/1 73 square meters)    Stage 3A Moderate CKD (GFR = 45-59 mL/min/1 73 square meters)    Stage 3B Moderate CKD (GFR = 30-44 mL/min/1 73 square meters)    Stage 4 Severe CKD (GFR = 15-29 mL/min/1 73 square meters)    Stage 5 End Stage CKD (GFR <15 mL/min/1 73 square meters)  Note: GFR calculation is accurate only with a steady state creatinine    CBC and differential [[id number]]  (Abnormal) Collected: 02/25/21 0833    Lab Status: Final result Specimen: Blood from Arm, Left Updated: 02/25/21 0846     WBC 14 13 Thousand/uL      RBC 4 77 Million/uL      Hemoglobin 15 0 g/dL      Hematocrit 44 7 %      MCV 94 fL      MCH 31 4 pg      MCHC 33 6 g/dL      RDW 13 5 %      MPV 10 7 fL      Platelets 141 Thousands/uL      nRBC 0 /100 WBCs      Neutrophils Relative 80 %      Immat GRANS % 0 %      Lymphocytes Relative 11 %      Monocytes Relative 7 %      Eosinophils Relative 1 %      Basophils Relative 1 %      Neutrophils Absolute 11 30 Thousands/µL      Immature Grans Absolute 0 06 Thousand/uL      Lymphocytes Absolute 1 58 Thousands/µL      Monocytes Absolute 1 02 Thousand/µL      Eosinophils Absolute 0 10 Thousand/µL      Basophils Absolute 0 07 Thousands/µL PE Study with CT Abdomen and Pelvis with contrast   Final Result by Geni Christensen MD (02/25 1323)      No pulmonary embolism  Clear lungs  No acute pathology in the abdomen and pelvis  Workstation performed: [id number]         CT renal stone study abdomen pelvis without contrast   Final Result by Bam Ferris MD (02/25 1846)      No acute CT abnormality in the abdomen or pelvis  Specifically, no urinary tract calculi or obstructive uropathy  Normal appendix  Workstation performed: [id number]         US right upper quadrant    (Results Pending)              Procedures  Procedures         ED Course  ED Course as of Feb 25 1449   Thu Feb 25, 2021   0815 Orders placed  7835 Pt given Zofran/Toradol  0843 Pt reports pain is a little better, but still having pain  Order placed for morphine       0906 Pt given morphine  1133 Pt's pain returning  Pt given Toradol  1154 Pain is returning  Will give Dilaudid  1210 Pt given Dilaudid  1232 Updated pt on urine results  Pt states she's still having significant right flank pain even after dilaudid where it's a 7-8 and will intensify to 10  Will order CT C/A/P with contrast   Will order Fentanyl and Tylenol  1341 Updated pt on CT CAP  Still having pain despite multiple doses of narcotic pain meds  Pt states she cannot go home with this pain and would like to be admitted into the hospital for intractable pain                                                MDM  Number of Diagnoses or Management Options     Amount and/or Complexity of Data Reviewed  Clinical lab tests: ordered and reviewed  Tests in the radiology section of CPT®: ordered and reviewed  Tests in the medicine section of CPT®: reviewed and ordered        Disposition  Final diagnoses:   Right flank pain - Intractable   Nausea and vomiting     Time reflects when diagnosis was documented in both MDM as applicable and the Disposition within this note     Time User Action Codes Description Comment    2/25/2021  1:42 PM Emma Sanchez Add [R10 9] Right flank pain     2/25/2021  1:44 PM Daniel, [address] [R10 9] Right flank pain Intractable    2/25/2021  1:44 PM Emma Sanchez Add [R11 2] Nausea and vomiting       ED Disposition     ED Disposition Condition Date/Time Comment    Admit Stable Thu Feb 25, 2021  1:50 PM Case was discussed with Dr Jose Eduardo George and the patient's admission status was agreed to be Admission Status: observation status to the service of Dr Jose Eduardo George   Follow-up Information    None         Patient's Medications   Discharge Prescriptions    No medications on file     No discharge procedures on file      PDMP Review     None          ED Provider  Electronically Signed by           Pretty Ariraza 26, DO  02/25/21 7109

## 2021-02-25 NOTE — NURSING NOTE
Pt admitted to unit  Unable to stand due to pain in RUQ of abd  BS present  Pt states LBM was 2/24/2021  IVF infusing as ordered  States pain is 10/10  Crying  Family bedside  PRN pain meds given  Pt states pain is more tolerable at a 6/10 after PRN pain meds given  Scheduled Robaxin and PRN oxy now administered  Pt prefers to lie flat states sitting up causes emesis  BSC in room for BR needs at pt request  Dinner tray ordered  Call bell in reach  Will continue to monitor

## 2021-02-25 NOTE — H&P
[address] [facility]  H&P- Lakesha Bingham 1970, 52 y o  female MRN: [id number]    Unit/Bed#: ED 15 Encounter: [id number]    Primary Care Provider: David Parks DO   Date and time admitted to hospital: 2/25/2021  7:55 AM        * Intractable pain  Assessment & Plan  · Patient started with right flank/abdominal pain this morning during her normal routine  No injury  Had multiple episodes of liquid vomiting  Last meal was last night and was a ham and cheese sandwich  Follows typical American diet   · CT scans showed no signs of infection in chest, abdomen, or pelvis  Urinalysis not significant for bacteria or blood to suggest stone or infection  No PE  No colitis/diverticulitis  · DDx at this time includes biliary colic vs oblique muscle spasm, less likely vascular issue  · Admit patient to med/surg under observation status   · Check RUQ US  · Robaxin 1000 mg PO Q6   · Valium 5 mg IV once   · Tylenol PRN   · Toradol PRN     Leukocytosis  Assessment & Plan  · [place]ed at [address]  Suspect stress response from pain and vomiting  No signs of infection in urine, kidneys, abdomen, or chest   · Monitor CBC      VTE Prophylaxis: None needed as per VTE protocol   / reason for no mechanical VTE prophylaxis None needed as per VTE protocol    Code Status: Full Code   POLST: POLST form is not discussed and not completed at this time  Discussion with family: Discussed with  at bedside     Anticipated Length of Stay:  Patient will be admitted on an Observation basis with an anticipated length of stay of  Less than 2 midnights  Justification for Hospital Stay: as per above assessment and plan     Total Time for Visit, including Counseling / Coordination of Care: 1 hour  Greater than 50% of this total time spent on direct patient counseling and coordination of care      Chief Complaint:   Right Flank Pain     History of Present Illness:    Lakesha Bingham is a 52 y o  female with a history of migraines who presents with right sided flank pain  She reports that this started early this morning during her normal routine  She reports that her pain started in her back and she thought it might've been related to a prior history of herniated disc  However, shortly later she reports that she then had very severe pain in her right side  She then became nauseous and vomiting  Her mother who came to her house was going to take her to the hospital, but she was in too much pain to go so she came by ambulance  She reports that her vomit was "maybe liquid" because she didn't see it  She denies changes in her urine  States that her last bowel movement was yesterday  She denies fevers or chills  Denies chest pain or shortness of breath  She reports that she ate last night and had a ham and cheese sandwich  She reports that she still has her gallbladder and denies family history of gallbladder problems  Denies prior abdominal surgeries  Review of Systems:    Review of Systems   Constitutional: Negative for appetite change, chills, diaphoresis, fatigue and fever  HENT: Negative for congestion, rhinorrhea and sore throat  Eyes: Negative for visual disturbance  Respiratory: Negative for cough, chest tightness, shortness of breath and wheezing  Cardiovascular: Negative for chest pain, palpitations and leg swelling  Gastrointestinal: Positive for abdominal pain  Negative for constipation, diarrhea, nausea and vomiting  Genitourinary: Positive for flank pain  Negative for dysuria  Musculoskeletal: Negative for arthralgias and myalgias  Neurological: Negative for dizziness, syncope, weakness, light-headedness, numbness and headaches  All other systems reviewed and are negative        Past Medical and Surgical History:     Past Medical History:   Diagnosis Date    Arthritis     Gestational diabetes     Hyperlipidemia     Migraine     Seasonal allergies        Past Surgical History:   Procedure Laterality Date    BREAST BIOPSY Right 2016    benign    CARPAL TUNNEL RELEASE Right 2005    MANDIBLE FRACTURE SURGERY      jaw surgery    MA HALLUX RIGIDUS W/CHEILECTOMY 1ST MP JT W/IMPLT Right 12/18/2020    Procedure: BUNIONECTOMY WITH IMPLANT;  Surgeon: Vitaliy Tavarez DPM;  Location: [facility];  Service: Podiatry    TOE SURGERY Left     TONSILLECTOMY         Meds/Allergies:    Prior to Admission medications    Medication Sig Start Date End Date Taking? Authorizing Provider   diclofenac sodium (VOLTAREN) 1 % Apply 2 g topically 3 (three) times a day  Patient not taking: Reported on 1/21/2021 11/10/20 2/25/21  Vitaliy Tavarez DPM   ibuprofen (MOTRIN) 600 mg tablet Take 1 tablet (600 mg total) by mouth every 6 (six) hours as needed for mild pain  Patient not taking: Reported on 1/21/2021 12/18/20 2/25/21  Citlaly Campbell DPM   Multiple Vitamin (multivitamin) tablet Take 1 tablet by mouth daily  2/25/21  Historical Provider, MD   Pseudoephedrine HCl (SUDAFED PO) Take by mouth as needed  2/25/21  Historical Provider, MD     I have reviewed home medications with patient personally  Allergies:    Allergies   Allergen Reactions    Erythromycin Hives    Pollen Extract      sneezinig    Tetracycline Other (See Comments)     Rash or N/V    Levofloxacin Palpitations       Social History:     Marital Status:    Occupation: Noncontributory   Patient Pre-hospital Living Situation: Home   Patient Pre-hospital Level of Mobility: Full  Patient Pre-hospital Diet Restrictions: None  Substance Use History:   Social History     Substance and Sexual Activity   Alcohol Use Yes    Frequency: Monthly or less    Drinks per session: 1 or 2     Social History     Tobacco Use   Smoking Status Heavy Tobacco Smoker    Packs/day: 0 50    Types: Cigarettes   Smokeless Tobacco Never Used   Tobacco Comment    smoked cigarette 12/18     Social History     Substance and Sexual Activity   Drug Use No       Family History:    Family History Problem Relation Age of Onset    Hypothyroidism Mother     Cancer Father     Prostate cancer Father     Cancer Family     Heart disease Family     Hyperlipidemia Family         pure    No Known Problems Maternal Grandmother     No Known Problems Paternal Grandmother     No Known Problems Maternal Aunt     No Known Problems Paternal Aunt     No Known Problems Paternal Aunt     No Known Problems Paternal Aunt        Physical Exam:     Vitals:   Blood Pressure: 128/71 (02/25/21 1320)  Pulse: 63 (02/25/21 1320)  Temperature: 97 5 °F (36 4 °C) (02/25/21 1320)  Temp Source: Oral (02/25/21 1320)  Respirations: 18 (02/25/21 1320)  Height: 5' 1" (154 9 cm) (02/25/21 1320)  Weight - Scale: 66 4 kg (146 lb 6 2 oz) (02/25/21 1320)  SpO2: 98 % (02/25/21 1320)    Physical Exam  Constitutional:       General: She is in acute distress  Appearance: Normal appearance  She is normal weight  She is not ill-appearing or diaphoretic  HENT:      Head: Normocephalic and atraumatic  Mouth/Throat:      Mouth: Mucous membranes are moist    Eyes:      General: No scleral icterus  Pupils: Pupils are equal, round, and reactive to light  Cardiovascular:      Rate and Rhythm: Normal rate and regular rhythm  Pulses: Normal pulses  Heart sounds: Normal heart sounds, S1 normal and S2 normal  No murmur  No systolic murmur  No diastolic murmur  No gallop  No S3 or S4 sounds  Pulmonary:      Effort: Pulmonary effort is normal  No accessory muscle usage or respiratory distress  Breath sounds: Normal breath sounds  No stridor  No decreased breath sounds, wheezing, rhonchi or rales  Chest:      Chest wall: No tenderness  Abdominal:      General: Bowel sounds are normal  There is no distension  Palpations: Abdomen is soft  Tenderness: There is abdominal tenderness in the right upper quadrant and right lower quadrant  There is no right CVA tenderness or guarding     Musculoskeletal:      Right lower leg: No edema  Left lower leg: No edema  Skin:     General: Skin is warm and dry  Coloration: Skin is not jaundiced  Neurological:      General: No focal deficit present  Mental Status: She is alert  Mental status is at baseline  Motor: No tremor or seizure activity  Psychiatric:         Behavior: Behavior is cooperative  Additional Data:     Lab Results: I have personally reviewed pertinent reports  Results from last 7 days   Lab Units 02/25/21  0833   WBC Thousand/uL 14 13*   HEMOGLOBIN g/dL 15 0   HEMATOCRIT % 44 7   PLATELETS Thousands/uL 275   NEUTROS PCT % 80*   LYMPHS PCT % 11*   MONOS PCT % 7   EOS PCT % 1     Results from last 7 days   Lab Units 02/25/21  0833   SODIUM mmol/L 142   POTASSIUM mmol/L 3 8   CHLORIDE mmol/L 107   CO2 mmol/L 26   BUN mg/dL 14   CREATININE mg/dL 0 75   ANION GAP mmol/L 9   CALCIUM mg/dL 9 1   ALBUMIN g/dL 3 6   TOTAL BILIRUBIN mg/dL 0 32   ALK PHOS U/L 91   ALT U/L 20   AST U/L 10   GLUCOSE RANDOM mg/dL 144*                       Imaging: I have personally reviewed pertinent reports  PE Study with CT Abdomen and Pelvis with contrast   Final Result by Roseline Lino MD (02/25 1323)      No pulmonary embolism  Clear lungs  No acute pathology in the abdomen and pelvis  Workstation performed: [id number]         CT renal stone study abdomen pelvis without contrast   Final Result by Felisha Canela MD (02/25 9137)      No acute CT abnormality in the abdomen or pelvis  Specifically, no urinary tract calculi or obstructive uropathy  Normal appendix  Workstation performed: [id number]         US right upper quadrant    (Results Pending)       EKG, Pathology, and Other Studies Reviewed on Admission:   · CT Renal Stone: No acute CT abnormality in the abdomen or pelvis  Specifically, no urinary tract calculi or obstructive uropathy  · PE Study with CT abdomen pelvis with contrast: No PE  Clear lungs   No acute pathology in the abdomen and pelvis    Allscripts / Epic Records Reviewed: Yes     ** Please Note: This note has been constructed using a voice recognition system   **

## 2021-02-25 NOTE — ASSESSMENT & PLAN NOTE
· [place]ed at [id number]  Suspect stress response from pain and vomiting   No signs of infection in urine, kidneys, abdomen, or chest   · Monitor CBC

## 2021-02-25 NOTE — PLAN OF CARE
Problem: PAIN - ADULT  Goal: Verbalizes/displays adequate comfort level or baseline comfort level  Description: Interventions:  - Encourage patient to monitor pain and request assistance  - Assess pain using appropriate pain scale  - Administer analgesics based on type and severity of pain and evaluate response  - Implement non-pharmacological measures as appropriate and evaluate response  - Consider cultural and social influences on pain and pain management  - Notify physician/advanced practitioner if interventions unsuccessful or patient reports new pain  Outcome: Progressing     Problem: INFECTION - ADULT  Goal: Absence or prevention of progression during hospitalization  Description: INTERVENTIONS:  - Assess and monitor for signs and symptoms of infection  - Monitor lab/diagnostic results  - Monitor all insertion sites, i e  indwelling lines, tubes, and drains  - Monitor endotracheal if appropriate and nasal secretions for changes in amount and color  - [place] appropriate cooling/warming therapies per order  - Administer medications as ordered  - Instruct and encourage patient and family to use good hand hygiene technique  - Identify and instruct in appropriate isolation precautions for identified infection/condition  Outcome: Progressing     Problem: SAFETY ADULT  Goal: Patient will remain free of falls  Description: INTERVENTIONS:  - Assess patient frequently for physical needs  -  Identify cognitive and physical deficits and behaviors that affect risk of falls    -  [place] fall precautions as indicated by assessment   - Educate patient/family on patient safety including physical limitations  - Instruct patient to call for assistance with activity based on assessment  - Modify environment to reduce risk of injury  - Consider OT/PT consult to assist with strengthening/mobility  Outcome: Progressing  Goal: Maintain or return to baseline ADL function  Description: INTERVENTIONS:  -  Assess patient's ability to carry out ADLs; assess patient's baseline for ADL function and identify physical deficits which impact ability to perform ADLs (bathing, care of mouth/teeth, toileting, grooming, dressing, etc )  - Assess/evaluate cause of self-care deficits   - Assess range of motion  - Assess patient's mobility; develop plan if impaired  - Assess patient's need for assistive devices and provide as appropriate  - Encourage maximum independence but intervene and supervise when necessary  - Involve family in performance of ADLs  - Assess for home care needs following discharge   - Consider OT consult to assist with ADL evaluation and planning for discharge  - Provide patient education as appropriate  Outcome: Progressing  Goal: Maintain or return mobility status to optimal level  Description: INTERVENTIONS:  - Assess patient's baseline mobility status (ambulation, transfers, stairs, etc )    - Identify cognitive and physical deficits and behaviors that affect mobility  - Identify mobility aids required to assist with transfers and/or ambulation (gait belt, sit-to-stand, lift, walker, cane, etc )  - [place] fall precautions as indicated by assessment  - Record patient progress and toleration of activity level on Mobility SBAR; progress patient to next Phase/Stage  - Instruct patient to call for assistance with activity based on assessment  - Consider rehabilitation consult to assist with strengthening/weightbearing, etc   Outcome: Progressing     Problem: DISCHARGE PLANNING  Goal: Discharge to home or other facility with appropriate resources  Description: INTERVENTIONS:  - Identify barriers to discharge w/patient and caregiver  - Arrange for needed discharge resources and transportation as appropriate  - Identify discharge learning needs (meds, wound care, etc )  - Arrange for interpretive services to assist at discharge as needed  - Refer to Case Management Department for coordinating discharge planning if the patient needs post-hospital services based on physician/advanced practitioner order or complex needs related to functional status, cognitive ability, or social support system  Outcome: Progressing     Problem: Knowledge Deficit  Goal: Patient/family/caregiver demonstrates understanding of disease process, treatment plan, medications, and discharge instructions  Description: Complete learning assessment and assess knowledge base    Interventions:  - Provide teaching at level of understanding  - Provide teaching via preferred learning methods  Outcome: Progressing     Problem: MUSCULOSKELETAL - ADULT  Goal: Maintain or return mobility to safest level of function  Description: INTERVENTIONS:  - Assess patient's ability to carry out ADLs; assess patient's baseline for ADL function and identify physical deficits which impact ability to perform ADLs (bathing, care of mouth/teeth, toileting, grooming, dressing, etc )  - Assess/evaluate cause of self-care deficits   - Assess range of motion  - Assess patient's mobility  - Assess patient's need for assistive devices and provide as appropriate  - Encourage maximum independence but intervene and supervise when necessary  - Involve family in performance of ADLs  - Assess for home care needs following discharge   - Consider OT consult to assist with ADL evaluation and planning for discharge  - Provide patient education as appropriate  Outcome: Progressing  Goal: Maintain proper alignment of affected body part  Description: INTERVENTIONS:  - Support, maintain and protect limb and body alignment  - Provide patient/ family with appropriate education  Outcome: Progressing

## 2021-02-25 NOTE — ED NOTES
Pt states she has 0% chance of pregnancy  Pt states she is post menopausal  Pt given explanation on medications       Abby Braxton RN  02/25/21 5374

## 2021-02-25 NOTE — ASSESSMENT & PLAN NOTE
· Patient started with right flank/abdominal pain this morning during her normal routine  No injury  Had multiple episodes of liquid vomiting  Last meal was last night and was a ham and cheese sandwich  Follows typical American diet   · CT scans showed no signs of infection in chest, abdomen, or pelvis  Urinalysis not significant for bacteria or blood to suggest stone or infection  No PE  No colitis/diverticulitis     · DDx at this time includes biliary colic vs oblique muscle spasm, less likely vascular issue  · Admit patient to med/surg under observation status   · Check RUQ US  · Robaxin 1000 mg PO Q6   · Valium 5 mg IV once   · Tylenol PRN   · Toradol PRN

## 2021-02-26 VITALS
BODY MASS INDEX: 27.64 KG/M2 | HEART RATE: 59 BPM | SYSTOLIC BLOOD PRESSURE: 112 MMHG | OXYGEN SATURATION: 96 % | TEMPERATURE: 97.8 F | WEIGHT: 146.39 LBS | HEIGHT: 61 IN | DIASTOLIC BLOOD PRESSURE: 83 MMHG | RESPIRATION RATE: 19 BRPM

## 2021-02-26 LAB
ALBUMIN SERPL BCP-MCNC: 2.7 G/DL (ref 3.5–5)
ALP SERPL-CCNC: 69 U/L (ref 46–116)
ALT SERPL W P-5'-P-CCNC: 18 U/L (ref 12–78)
ANION GAP SERPL CALCULATED.3IONS-SCNC: 9 MMOL/L (ref 4–13)
AST SERPL W P-5'-P-CCNC: 10 U/L (ref 5–45)
BASOPHILS # BLD AUTO: 0.05 THOUSANDS/ΜL (ref 0–0.1)
BASOPHILS NFR BLD AUTO: 1 % (ref 0–1)
BILIRUB SERPL-MCNC: 0.45 MG/DL (ref 0.2–1)
BUN SERPL-MCNC: 7 MG/DL (ref 5–25)
CALCIUM ALBUM COR SERPL-MCNC: 9.2 MG/DL (ref 8.3–10.1)
CALCIUM SERPL-MCNC: 8.2 MG/DL (ref 8.3–10.1)
CHLORIDE SERPL-SCNC: 107 MMOL/L (ref 100–108)
CO2 SERPL-SCNC: 24 MMOL/L (ref 21–32)
CREAT SERPL-MCNC: 0.58 MG/DL (ref 0.6–1.3)
EOSINOPHIL # BLD AUTO: 0.14 THOUSAND/ΜL (ref 0–0.61)
EOSINOPHIL NFR BLD AUTO: 2 % (ref 0–6)
ERYTHROCYTE [DISTWIDTH] IN BLOOD BY AUTOMATED COUNT: 13.9 % (ref 11.6–15.1)
GFR SERPL CREATININE-BSD FRML MDRD: 109 ML/MIN/1.73SQ M
GLUCOSE SERPL-MCNC: 103 MG/DL (ref 65–140)
HCT VFR BLD AUTO: 37.8 % (ref 34.8–46.1)
HGB BLD-MCNC: 12.1 G/DL (ref 11.5–15.4)
IMM GRANULOCYTES # BLD AUTO: 0.01 THOUSAND/UL (ref 0–0.2)
IMM GRANULOCYTES NFR BLD AUTO: 0 % (ref 0–2)
LYMPHOCYTES # BLD AUTO: 3.24 THOUSANDS/ΜL (ref 0.6–4.47)
LYMPHOCYTES NFR BLD AUTO: 46 % (ref 14–44)
MAGNESIUM SERPL-MCNC: 1.9 MG/DL (ref 1.6–2.6)
MCH RBC QN AUTO: 30.8 PG (ref 26.8–34.3)
MCHC RBC AUTO-ENTMCNC: 32 G/DL (ref 31.4–37.4)
MCV RBC AUTO: 96 FL (ref 82–98)
MONOCYTES # BLD AUTO: 0.68 THOUSAND/ΜL (ref 0.17–1.22)
MONOCYTES NFR BLD AUTO: 10 % (ref 4–12)
NEUTROPHILS # BLD AUTO: 2.9 THOUSANDS/ΜL (ref 1.85–7.62)
NEUTS SEG NFR BLD AUTO: 41 % (ref 43–75)
NRBC BLD AUTO-RTO: 0 /100 WBCS
PLATELET # BLD AUTO: 222 THOUSANDS/UL (ref 149–390)
PMV BLD AUTO: 10.8 FL (ref 8.9–12.7)
POTASSIUM SERPL-SCNC: 3.8 MMOL/L (ref 3.5–5.3)
PROT SERPL-MCNC: 5.6 G/DL (ref 6.4–8.2)
RBC # BLD AUTO: 3.93 MILLION/UL (ref 3.81–5.12)
SODIUM SERPL-SCNC: 140 MMOL/L (ref 136–145)
WBC # BLD AUTO: 7.02 THOUSAND/UL (ref 4.31–10.16)

## 2021-02-26 PROCEDURE — 99245 OFF/OP CONSLTJ NEW/EST HI 55: CPT | Performed by: PHYSICIAN ASSISTANT

## 2021-02-26 PROCEDURE — 80053 COMPREHEN METABOLIC PANEL: CPT | Performed by: PHYSICIAN ASSISTANT

## 2021-02-26 PROCEDURE — 83735 ASSAY OF MAGNESIUM: CPT | Performed by: HOSPITALIST

## 2021-02-26 PROCEDURE — 85025 COMPLETE CBC W/AUTO DIFF WBC: CPT | Performed by: PHYSICIAN ASSISTANT

## 2021-02-26 PROCEDURE — 99244 OFF/OP CNSLTJ NEW/EST MOD 40: CPT | Performed by: SURGERY

## 2021-02-26 PROCEDURE — 99217 PR OBSERVATION CARE DISCHARGE MANAGEMENT: CPT | Performed by: PHYSICIAN ASSISTANT

## 2021-02-26 RX ORDER — POLYETHYLENE GLYCOL 3350 17 G/17G
17 POWDER, FOR SOLUTION ORAL DAILY
Qty: 30 EACH | Refills: 0 | Status: SHIPPED | OUTPATIENT
Start: 2021-02-27

## 2021-02-26 RX ORDER — OXYCODONE HYDROCHLORIDE 5 MG/1
5 TABLET ORAL EVERY 6 HOURS PRN
Qty: 15 TABLET | Refills: 0 | Status: SHIPPED | OUTPATIENT
Start: 2021-02-26 | End: 2021-03-08

## 2021-02-26 RX ORDER — METHOCARBAMOL 500 MG/1
500 TABLET, FILM COATED ORAL EVERY 6 HOURS PRN
Qty: 40 TABLET | Refills: 0 | Status: ON HOLD | OUTPATIENT
Start: 2021-02-26 | End: 2021-05-13

## 2021-02-26 RX ORDER — POLYETHYLENE GLYCOL 3350 17 G/17G
17 POWDER, FOR SOLUTION ORAL DAILY
Status: DISCONTINUED | OUTPATIENT
Start: 2021-02-26 | End: 2021-02-26 | Stop reason: HOSPADM

## 2021-02-26 RX ADMIN — METHOCARBAMOL 1000 MG: 500 TABLET ORAL at 05:41

## 2021-02-26 RX ADMIN — METHOCARBAMOL 1000 MG: 500 TABLET ORAL at 11:59

## 2021-02-26 RX ADMIN — BISACODYL 10 MG: 5 TABLET, COATED ORAL at 14:51

## 2021-02-26 RX ADMIN — METHOCARBAMOL 1000 MG: 500 TABLET ORAL at 17:00

## 2021-02-26 RX ADMIN — POLYETHYLENE GLYCOL 3350 17 G: 17 POWDER, FOR SOLUTION ORAL at 14:51

## 2021-02-26 RX ADMIN — OXYCODONE HYDROCHLORIDE 5 MG: 5 TABLET ORAL at 13:37

## 2021-02-26 NOTE — ASSESSMENT & PLAN NOTE
· WBCs elevated at 14 at time of admission   · Possibly reactive  · Remained afebrile during hospitalization   · Improved today

## 2021-02-26 NOTE — PROGRESS NOTES
Progress Note - General Surgery   Miguel Hanks 52 y o  female MRN: [id number]  Unit/Bed#: E5 -01 Encounter: [id number]    Assessment:  Her abdominal pain much improved this morning  No surgical indication    Plan:   Care per primary  Recommend HIDA scan if further workups fails to reveal any pathology, but this can be done as an outpatient  Subjective/Objective     Subjective:   Pain is much better this morning, denies nausea or vomiting, had BM    Objective:    Blood pressure 120/61, pulse (!) 53, temperature (!) 97 3 °F (36 3 °C), temperature source Temporal, resp  rate 18, height 5' 1" (1 549 m), weight 66 4 kg (146 lb 6 2 oz), SpO2 98 %, not currently breastfeeding  ,Body mass index is 27 66 kg/m²        Intake/Output Summary (Last 24 hours) at 2/26/2021 0759  Last data filed at 2/25/2021 1801  Gross per 24 hour   Intake 3357 5 ml   Output --   Net 3357 5 ml       Invasive Devices     Peripheral Intravenous Line            Peripheral IV 02/25/21 Left Antecubital less than 1 day                Physical Exam:   Gen:  NAD  CV:  warm, well-perfused  Lungs: nl effort  Abd:  soft, NT/ND  Ext:  no CCE  Neuro: A&Ox3     Results from last 7 days   Lab Units 02/26/21  0526 02/25/21  0833   WBC Thousand/uL 7 02 14 13*   HEMOGLOBIN g/dL 12 1 15 0   HEMATOCRIT % 37 8 44 7   PLATELETS Thousands/uL 222 275     Results from last 7 days   Lab Units 02/26/21  0526 02/25/21  0833   POTASSIUM mmol/L 3 8 3 8   CHLORIDE mmol/L 107 107   CO2 mmol/L 24 26   BUN mg/dL 7 14   CREATININE mg/dL 0 58* 0 75   CALCIUM mg/dL 8 2* 9 1

## 2021-02-26 NOTE — NURSING NOTE
Discharge instructions reviewed with pt and pt spouse  Both verbalized understanding  Pt received scheduled Robaxin prior to leaving   driving  PIV removed  Belongings with pt

## 2021-02-26 NOTE — ASSESSMENT & PLAN NOTE
· Patient started with right flank/abdominal pain this morning during her normal routine  No injury  Had multiple episodes of liquid vomiting  · CT scans showed no signs of infection in chest, abdomen, or pelvis  Urinalysis not significant for bacteria or blood to suggest stone or infection  No PE  No colitis/diverticulitis     · Concern for colonic spasm vs  Constipation vs  Passed kidney stone   · Seen in consult by GI- recommending outpatient follow up with GI for scope    · Pain improved with Robaxin   · Will send home with script for Robaxin and PRN oxycodone

## 2021-02-26 NOTE — UTILIZATION REVIEW
Initial Clinical Review    Admission: Date/Time/Statement:   Admission Orders (From admission, onward)     Ordered        02/25/21 1353  Place in Observation  Once                   Orders Placed This Encounter   Procedures    Place in Observation     Standing Status:   Standing     Number of Occurrences:   1     Order Specific Question:   Level of Care     Answer:   Med Surg [16]     ED Arrival Information     Expected Arrival Acuity Means of Arrival Escorted By Service Admission Type    - 2/25/2021 07:55 Urgent Ambulance [facility] General Medicine Urgent    Arrival Complaint    flank pain        Chief Complaint   Patient presents with    Flank Pain     pt c/o of right flank pain that radiates to RUQ  pt states the pain started this morning at 6:00  pt denies cp/sob  pt c/o n/v but denies diarrhea  pt vomiting on arrival     Assessment/Plan:    52 Y O female presents to ED via EMS from home with right sided flank pain which started early the am of admission  Pain started in back,  Became  Very severe in right side, started with nausea and vomiting  Unable to get in a car, had to call EMS  Labs reveal elevated WBC  CT scan unremarkable  Admit Observation with Intractable pain and leukocytosis and plan is  Monitor labs, pain control, anti emetics as needed and wait  Additional diagnostics  Surgery consult ( 2/25)     No surgical intervention planned at this time  Recommend HIDA  Scan if  Symptoms persist     Gi consult  ( 2/26)     Labs/CT scan normal   Recommend starting bowel regimen  Needs Op colonoscopy  Symptoms may be from referred back pain           ED Triage Vitals [02/25/21 0802]   Temperature Pulse Respirations Blood Pressure SpO2   97 5 °F (36 4 °C) 68 18 146/82 100 %      Temp Source Heart Rate Source Patient Position - Orthostatic VS BP Location FiO2 (%)   Oral Monitor Lying Right arm --      Pain Score       9          Wt Readings from Last 1 Encounters:   02/25/21 66 4 kg (146 lb 6 2 oz)     Additional Vital Signs:   02/26/21 0829  97 2 °F (36 2 °C)Abnormal   56  18  108/55  95 %  None (Room air)  Lying   02/25/21 2319  97 3 °F (36 3 °C)Abnormal   53Abnormal   18  120/61  98 %  --  Lying   02/25/21 1646  97 5 °F (36 4 °C)  59  18  119/72  100 %  None (Room air)  Lying   02/25/21 1628  --  55  18  118/68  99 %  None (Room air)  Lying   02/25/21 1320  97 5 °F (36 4 °C)  63  18  128/71  98 %  None (Room air)  Lying   02/25/21 1111  --  61  18  133/70  99 %  None (Room air)  Lying   02/25/21 0807  --  --  --  --  --  None (Room air)  --   02/25/21 0802  97 5 °F (36 4 °C)  68  18  146/82  100 %  T-Piece  Lying         Pertinent Labs/Diagnostic Test Results:   U/S  RUQ  ( 2/25)     Mild hepatomegaly     Mild hepatic steatosis  No other significant finding  CT  Abd/pelvis  ( 2/25)     No pulmonary embolism   Clear lungs  No acute pathology in the abdomen and pelvis         Results from last 7 days   Lab Units 02/26/21  0526 02/25/21  0833   WBC Thousand/uL 7 02 14 13*   HEMOGLOBIN g/dL 12 1 15 0   HEMATOCRIT % 37 8 44 7   PLATELETS Thousands/uL 222 275   NEUTROS ABS Thousands/µL 2 90 11 30*         Results from last 7 days   Lab Units 02/26/21  0526 02/25/21  0833   SODIUM mmol/L 140 142   POTASSIUM mmol/L 3 8 3 8   CHLORIDE mmol/L 107 107   CO2 mmol/L 24 26   ANION GAP mmol/L 9 9   BUN mg/dL 7 14   CREATININE mg/dL 0 58* 0 75   EGFR ml/min/1 73sq m 109 95   CALCIUM mg/dL 8 2* 9 1   MAGNESIUM mg/dL 1 9  --      Results from last 7 days   Lab Units 02/26/21  0526 02/25/21  0833   AST U/L 10 10   ALT U/L 18 20   ALK PHOS U/L 69 91   TOTAL PROTEIN g/dL 5 6* 6 9   ALBUMIN g/dL 2 7* 3 6   TOTAL BILIRUBIN mg/dL 0 45 0 32         Results from last 7 days   Lab Units 02/26/21  0526 02/25/21  0833   GLUCOSE RANDOM mg/dL 103 144*               Results from last 7 days   Lab Units 02/25/21  2033   LACTIC ACID mmol/L 0 9           Results from last 7 days   Lab Units 02/25/21  0833   LIPASE u/L 116 Results from last 7 days   Lab Units 02/25/21  1211 02/25/21  1206   CLARITY UA  Cloudy Clear   COLOR UA  Yellow Yellow   SPEC GRAV UA  >=1 030 >=1 030   PH UA  5 5 6 0   GLUCOSE UA mg/dl Negative Negative   KETONES UA mg/dl Trace* Trace*   BLOOD UA  Negative Negative   PROTEIN UA mg/dl Negative Negative   NITRITE UA  Negative Negative   BILIRUBIN UA  Negative Negative   UROBILINOGEN UA E U /dl 0 2 0 2   LEUKOCYTES UA  Negative Negative       ED Treatment:   Medication Administration from 02/25/2021 0755 to 02/25/2021 1634       Date/Time Order Dose Route Action Comments     02/25/2021 1012 sodium chloride 0 9 % bolus 1,000 mL 0 mL Intravenous Stopped      02/25/2021 0822 sodium chloride 0 9 % bolus 1,000 mL 1,000 mL Intravenous New Bag      02/25/2021 0824 ketorolac (TORADOL) injection 15 mg 15 mg Intravenous Given      02/25/2021 0823 ondansetron (ZOFRAN) injection 4 mg 4 mg Intravenous Given      02/25/2021 0906 morphine (PF) 4 mg/mL injection 6 mg 4 mg Intravenous Given pt refused the last 2 mg      02/25/2021 1425 sodium chloride 0 9 % bolus 1,000 mL 0 mL Intravenous Stopped      02/25/2021 1108 sodium chloride 0 9 % bolus 1,000 mL 1,000 mL Intravenous New Bag      02/25/2021 1133 ketorolac (TORADOL) injection 15 mg 15 mg Intravenous Given      02/25/2021 1210 HYDROmorphone (DILAUDID) injection 0 5 mg 0 5 mg Intravenous Given      02/25/2021 1318 fentanyl citrate (PF) 100 MCG/2ML 25 mcg 25 mcg Intravenous Given      02/25/2021 1316 acetaminophen (TYLENOL) tablet 650 mg 650 mg Oral Given pt refused     02/25/2021 1306 iohexol (OMNIPAQUE) 350 MG/ML injection (SINGLE-DOSE) 100 mL 100 mL Intravenous Given      02/25/2021 1451 sodium chloride 0 9 % infusion 75 mL/hr Intravenous New Bag      02/25/2021 1447 nicotine (NICODERM CQ) 21 mg/24 hr TD 24 hr patch 1 patch 1 patch Transdermal Not Given returned to cabinet     02/25/2021 1454 methocarbamol (ROBAXIN) tablet 1,000 mg 1,000 mg Oral Given 02/25/2021 1454 diazepam (VALIUM) injection 5 mg 5 mg Intravenous Given      Present on Admission:   Intractable pain   Leukocytosis      Admitting Diagnosis: Nausea and vomiting [R11 2]  Flank pain [R10 9]  Right flank pain [R10 9]  Age/Sex: 52 y o  female  Admission Orders:  Scheduled Medications:  methocarbamol, 1,000 mg, Oral, Q6H [facility] & long term  nicotine, 1 patch, Transdermal, Daily      Continuous IV Infusions:  sodium chloride, 75 mL/hr, Intravenous, Continuous      PRN Meds:  acetaminophen, 650 mg, Oral, Q6H PRN  ketorolac, 30 mg, Intravenous, Q6H PRN  ( x1  2/25)  morphine injection, 2 mg, Intravenous, Q4H PRN  ondansetron, 4 mg, Intravenous, Q6H PRN  oxyCODONE, 5 mg, Oral, Q4H PRN        IP CONSULT TO ACUTE CARE SURGERY    Network Utilization Review Department  ATTENTION: Please call with any questions or concerns to [phone] and carefully listen to the prompts so that you are directed to the right person  All voicemails are confidential   Chrystine Can all requests for admission clinical reviews, approved or denied determinations and any other requests to dedicated fax number below belonging to the campus where the patient is receiving treatment   List of dedicated fax numbers for the Facilities:  [address] DENIALS (Administrative/Medical Necessity) [phone]   [address] (Maternity/NICU/Pediatrics) [phone]   [address] Darci Martin 2127 (  Luis Dos Santos "Katalina" 103) [address] [place] [address] Kurt Diaz [address] [place]) [phone] [address]  [phone]

## 2021-02-26 NOTE — CONSULTS
Consultation - [facility]  Bj Cedillo 52 y o  female MRN: [id number]  Unit/Bed#: E5 -01 Encounter: [id number]        ASSESSMENT/PLAN:   Nausea/vomiting  Constipation  Abdominal pain    Patient presented with acute onset of nausea/vomiting, abdominal pain and change in bowel habits  Lab studies and CT scan appear within normal limits  Would recommend starting on a bowel regimen to see if this helps relieve her symptoms  Would also recommend outpatient colonoscopy given change in bowel habits  Symptoms may also be secondary to referred back pain  Consults    Reason for Consult / Principal Problem: Intractable pain    HPI: Bj Cedillo is a 52y o  year old female who presented with right-sided back/flank pain, radiating into her right lower quadrant starting yesterday  She states she was in her usual state of health until Wednesday when she did not have a normal bowel movement  The pain started the following day  This was associated with nausea and vomiting  She describes as bilious, nonbloody  On admission she had a CT scan that was within normal limits  She also had an ultrasound that showed hepatic steatosis  She felt well this morning and was able to tolerate a diet but unfortunately her pain returned  She has never had an endoscopy or colonoscopy  CBC and CMP within normal limits  Review of Systems: as per HPI  Review of Systems   All other systems reviewed and are negative        Historical Information   Past Medical History:   Diagnosis Date    Arthritis     Gestational diabetes     Hyperlipidemia     Migraine     Seasonal allergies      Past Surgical History:   Procedure Laterality Date    BREAST BIOPSY Right 2016    benign    CARPAL TUNNEL RELEASE Right 2005    MANDIBLE FRACTURE SURGERY      jaw surgery    FL HALLUX RIGIDUS W/CHEILECTOMY 1ST MP JT W/IMPLT Right 12/18/2020    Procedure: BUNIONECTOMY WITH IMPLANT;  Surgeon: Martha Multani DPM; Location: [facility];  Service: Podiatry    TOE SURGERY Left     TONSILLECTOMY       Social History   Social History     Substance and Sexual Activity   Alcohol Use Yes    Frequency: Monthly or less    Drinks per session: 1 or 2     Social History     Substance and Sexual Activity   Drug Use No     Social History     Tobacco Use   Smoking Status Heavy Tobacco Smoker    Packs/day: 0 50    Types: Cigarettes   Smokeless Tobacco Never Used   Tobacco Comment    smoked cigarette 12/18     Family History   Problem Relation Age of Onset    Hypothyroidism Mother     Cancer Father     Prostate cancer Father     Cancer Family     Heart disease Family     Hyperlipidemia Family         pure    No Known Problems Maternal Grandmother     No Known Problems Paternal Grandmother     No Known Problems Maternal Aunt     No Known Problems Paternal Aunt     No Known Problems Paternal Aunt     No Known Problems Paternal Aunt        Meds/Allergies     No medications prior to admission       Current Facility-Administered Medications   Medication Dose Route Frequency    acetaminophen (TYLENOL) tablet 650 mg  650 mg Oral Q6H PRN    bisacodyl (DULCOLAX) EC tablet 10 mg  10 mg Oral Once    methocarbamol (ROBAXIN) tablet 1,000 mg  1,000 mg Oral Q6H [facility]    morphine injection 2 mg  2 mg Intravenous Q4H PRN    nicotine (NICODERM CQ) 21 mg/24 hr TD 24 hr patch 1 patch  1 patch Transdermal Daily    ondansetron (ZOFRAN) injection 4 mg  4 mg Intravenous Q6H PRN    oxyCODONE (ROXICODONE) IR tablet 5 mg  5 mg Oral Q4H PRN    polyethylene glycol (MIRALAX) packet 17 g  17 g Oral Daily    sodium chloride 0 9 % infusion  75 mL/hr Intravenous Continuous       Allergies   Allergen Reactions    Erythromycin Hives    Pollen Extract      sneezinig    Tetracycline Other (See Comments)     Rash or N/V    Levofloxacin Palpitations       Objective     Blood pressure 108/55, pulse 56, temperature (!) 97 2 °F (36 2 °C), temperature source Temporal, resp  rate 18, height 5' 1" (1 549 m), weight 66 4 kg (146 lb 6 2 oz), SpO2 95 %, not currently breastfeeding  Intake/Output Summary (Last 24 hours) at 2/26/2021 1447  Last data filed at 2/25/2021 1801  Gross per 24 hour   Intake 357 5 ml   Output --   Net 357 5 ml       PHYSICAL EXAM     Physical Exam  Constitutional:       Appearance: Normal appearance  She is well-developed  HENT:      Head: Normocephalic and atraumatic  Eyes:      Conjunctiva/sclera: Conjunctivae normal    Neck:      Musculoskeletal: Normal range of motion  Cardiovascular:      Rate and Rhythm: Normal rate and regular rhythm  Pulmonary:      Effort: Pulmonary effort is normal       Breath sounds: Normal breath sounds  Abdominal:      General: Bowel sounds are normal  There is no distension  Palpations: Abdomen is soft  Tenderness: There is abdominal tenderness (Mild tenderness palpation right lower quadrant)  Musculoskeletal: Normal range of motion  Skin:     General: Skin is warm and dry  Neurological:      Mental Status: She is alert and oriented to person, place, and time  Psychiatric:         Mood and Affect: Mood normal          Behavior: Behavior normal          Lab Results:   CBC:   Lab Results   Component Value Date    WBC 7 02 02/26/2021    HGB 12 1 02/26/2021    HCT 37 8 02/26/2021    MCV 96 02/26/2021     02/26/2021    MCH 30 8 02/26/2021    MCHC 32 0 02/26/2021    RDW 13 9 02/26/2021    MPV 10 8 02/26/2021    NRBC 0 02/26/2021   ,   CMP:   Lab Results   Component Value Date    K 3 8 02/26/2021     02/26/2021    CO2 24 02/26/2021    BUN 7 02/26/2021    CREATININE 0 58 (L) 02/26/2021    CALCIUM 8 2 (L) 02/26/2021    AST 10 02/26/2021    ALT 18 02/26/2021    ALKPHOS 69 02/26/2021    EGFR 109 02/26/2021   ,   Imaging Studies: I have personally reviewed pertinent reports  US:  Mild hepatomegaly     Mild hepatic steatosis      No other significant finding      CTA chest/abd/pelvis:  No pulmonary embolism  Clear lungs      No acute pathology in the abdomen and pelvis

## 2021-02-26 NOTE — CONSULTS
Consultation - General Surgery   Beatris Willson 52 y o  female MRN: [id number]  Unit/Bed#: E5 -01 Encounter: [id number]    Assessment/Plan     Assessment:  47-year-old female with right back and 2 right upper quadrant pain with nausea vomiting  No indication for surgical intervention  Will likely due to the disease which can be fixed surgically  We recommend HIDA scan if further workup does not reviewed any pathology to rule out biliary dyskinesia, however, this can be done as an outpatient    Plan:  Care per primary  Recommend HIDA scan if further workups fails to reveal any pathology, but this can be done as an outpatient  History of Present Illness     HPI:  Beatris Willson is a 52 y o  female who presents with right back pain and right upper quadrant pain, which was accompanied with nausea and vomiting  Patient started the pain started this morning from right upper back, and moved to right upper quadrant radiates to right lower quadrant, she also stated she has had nausea and vomiting  The patient was hospitalized under [facility] service  Of vital signs are stable, and her labs unremarkable except elevated white blood cell  CT scans did not show any acute abdominal finding  No appendicitis, dissection, kidney stones or bowel obstruction  Ultrasound showed no gallstones  General surgery was consulted because she is in significant amount of pain  She denies fever, shortness of breath, chest pain or cough, no previous abdominal surgery          Inpatient consult to Acute Care Surgery     Performed by  Neli Dill MD     Authorized by Thierno Saucedo DO              Review of Systems   Constitutional: Positive for activity change  HENT: Negative  Eyes: Negative  Respiratory: Negative  Cardiovascular: Negative  Gastrointestinal: Positive for abdominal pain, nausea and vomiting  Negative for diarrhea  Genitourinary: Negative  Musculoskeletal: Negative  Skin: Negative  Allergic/Immunologic: Negative  Neurological: Negative  Hematological: Negative  Psychiatric/Behavioral: Negative  All other systems reviewed and are negative        Historical Information   Past Medical History:   Diagnosis Date    Arthritis     Gestational diabetes     Hyperlipidemia     Migraine     Seasonal allergies      Past Surgical History:   Procedure Laterality Date    BREAST BIOPSY Right 2016    benign    CARPAL TUNNEL RELEASE Right 2005    MANDIBLE FRACTURE SURGERY      jaw surgery    [place] HALLUX RIGIDUS W/CHEILECTOMY 1ST MP JT W/IMPLT Right 12/18/2020    Procedure: BUNIONECTOMY WITH IMPLANT;  Surgeon: Judie Armijo DPM;  Location: [facility];  Service: Podiatry    TOE SURGERY Left     TONSILLECTOMY       Social History   Social History     Substance and Sexual Activity   Alcohol Use Yes    Frequency: Monthly or less    Drinks per session: 1 or 2     Social History     Substance and Sexual Activity   Drug Use No     E-Cigarette/Vaping    E-Cigarette Use Never User      E-Cigarette/Vaping Substances    Nicotine No     THC No     CBD No     Flavoring No     Other No     Unknown No      Social History     Tobacco Use   Smoking Status Heavy Tobacco Smoker    Packs/day: 0 50    Types: Cigarettes   Smokeless Tobacco Never Used   Tobacco Comment    smoked cigarette 12/18     Family History: non-contributory    Meds/Allergies   all current active meds have been reviewed  Allergies   Allergen Reactions    Erythromycin Hives    Pollen Extract      sneezinig    Tetracycline Other (See Comments)     Rash or N/V    Levofloxacin Palpitations       Objective   First Vitals:   Blood Pressure: 146/82 (02/25/21 0802)  Pulse: 68 (02/25/21 0802)  Temperature: 97 5 °F (36 4 °C) (02/25/21 0802)  Temp Source: Oral (02/25/21 0802)  Respirations: 18 (02/25/21 0802)  Height: 5' 1" (154 9 cm) (02/25/21 1320)  Weight - Scale: 66 4 kg (146 lb 6 2 oz) (02/25/21 0802)  SpO2: 100 % (02/25/21 8759)    Current Vitals:   Blood Pressure: 119/72 (02/25/21 1646)  Pulse: 59 (02/25/21 1646)  Temperature: 97 5 °F (36 4 °C) (02/25/21 1646)  Temp Source: Temporal (02/25/21 1646)  Respirations: 18 (02/25/21 1646)  Height: 5' 1" (154 9 cm) (02/25/21 1320)  Weight - Scale: 66 4 kg (146 lb 6 2 oz) (02/25/21 1320)  SpO2: 100 % (02/25/21 1646)      Intake/Output Summary (Last 24 hours) at 2/25/2021 1917  Last data filed at 2/25/2021 1801  Gross per 24 hour   Intake 3357 5 ml   Output --   Net 3357 5 ml       Invasive Devices     Peripheral Intravenous Line            Peripheral IV 02/25/21 Left Antecubital less than 1 day                Physical Exam    Lab Results: I have personally reviewed pertinent lab results  Imaging: I have personally reviewed pertinent reports  EKG, Pathology, and Other Studies: I have personally reviewed pertinent reports  Counseling / Coordination of Care  Total floor / unit time spent today 30 minutes  Greater than 50% of total time was spent with the patient and / or family counseling and / or coordination of care  A description of the counseling / coordination of care: Kate Matamoros

## 2021-02-26 NOTE — DISCHARGE SUMMARY
[address] [facility]  Discharge- Henry Hidalgo 1970, 52 y o  female MRN: [id number]    Unit/Bed#: E5 -01 Encounter: [id number]    Primary Care Provider: Marquez Carbajal DO   Date and time admitted to hospital: 2/25/2021  7:55 AM        * Intractable pain  Assessment & Plan  · Patient started with right flank/abdominal pain this morning during her normal routine  No injury  Had multiple episodes of liquid vomiting  · CT scans showed no signs of infection in chest, abdomen, or pelvis  Urinalysis not significant for bacteria or blood to suggest stone or infection  No PE  No colitis/diverticulitis  · Concern for colonic spasm vs  Constipation vs  Passed kidney stone   · Seen in consult by GI- recommending outpatient follow up with GI for scope    · Pain improved with Robaxin   · Will send home with script for Robaxin and PRN oxycodone     Leukocytosis  Assessment & Plan  · WBCs elevated at 14 at time of admission   · Possibly reactive  · Remained afebrile during hospitalization   · Improved today     Smoker  Assessment & Plan  · Nicotine patch offered while inpatient   · Tobacco cessation education       Discharging Physician / Practitioner: Bri Marquez PA-C  PCP: Marquez Carbajal DO  Admission Date:   Admission Orders (From admission, onward)     Ordered        02/25/21 1353  Place in Observation  Once                   Discharge Date: 02/26/21    Resolved Problems  Date Reviewed: 2/26/2021    None          Consultations During Hospital Stay:  · Gastroenterology  · General surgery    Procedures Performed:   Pe Study With Ct Abdomen And Pelvis With Contrast    Result Date: 2/25/2021  Impression: No pulmonary embolism  Clear lungs  No acute pathology in the abdomen and pelvis  Workstation performed: [id number]     Ct Renal Stone Study Abdomen Pelvis Without Contrast    Result Date: 2/25/2021  Impression: No acute CT abnormality in the abdomen or pelvis    Specifically, no urinary tract calculi or obstructive uropathy  Normal appendix  Workstation performed: [id number]     Us Right Upper Quadrant    Result Date: 2/25/2021  Impression: Mild hepatomegaly Mild hepatic steatosis  No other significant finding  Exam limitations as noted Workstation performed: [id number]       Significant Findings / Test Results:   · See above     Incidental Findings:   · See above     Test Results Pending at Discharge (will require follow up): · None     Outpatient Tests Requested:  · Outpatient PCP and GI follow-up    Complications:  None    Reason for Admission:  Intractable flank and abdominal pain    Hospital Course:     Miguel Knox is a 52 y o  female patient who originally presented to the hospital on 2/25/2021 due to intractable pain  Patient initially presented to the hospital complaint of severe back pain radiating to her right-sided abdomen  Also noted multiple episodes of vomiting  Patient was started on p o  Robaxin and p r n  Oxycodone with improvement  Was seen in consult by General surgery who noted no surgical intervention was required during hospitalization  Patient was also seen in consult by Gastroenterology  Plan for outpatient follow-up with endoscopy  On day of discharge, patient was tolerating oral diet without episodes of vomiting  Was started on bowel regimen by GI to be continued at discharge  Patient to follow up outpatient with GI  Please see above list of diagnoses and related plan for additional information  Condition at Discharge: stable     Discharge Day Visit / Exam:     Subjective:  Patient reports she is feeling well this afternoon  Was able to tolerate normal diet at lunch  Notes improvement in her abdominal pain since yesterday  Currently rating it a 4/10    Vitals: Blood Pressure: 112/83 (02/26/21 1518)  Pulse: 59 (02/26/21 1518)  Temperature: 97 8 °F (36 6 °C) (02/26/21 1518)  Temp Source: Temporal (02/26/21 1518)  Respirations: 19 (02/26/21 1518)  Height: 5' 1" (154 9 cm) (02/25/21 1320)  Weight - Scale: 66 4 kg (146 lb 6 2 oz) (02/25/21 1320)  SpO2: 96 % (02/26/21 1518)  Exam:   Physical Exam  Vitals signs reviewed  Constitutional:       General: She is not in acute distress  HENT:      Head: Normocephalic and atraumatic  Eyes:      General: No scleral icterus  Conjunctiva/sclera: Conjunctivae normal    Neck:      Musculoskeletal: Neck supple  Cardiovascular:      Rate and Rhythm: Normal rate and regular rhythm  Heart sounds: No murmur  Pulmonary:      Effort: Pulmonary effort is normal  No respiratory distress  Breath sounds: Normal breath sounds  No wheezing  Abdominal:      General: Bowel sounds are normal  There is no distension  Palpations: Abdomen is soft  Tenderness: There is abdominal tenderness (mild RUQ/RLQ)  Musculoskeletal:      Right lower leg: No edema  Left lower leg: No edema  Skin:     General: Skin is warm and dry  Neurological:      Mental Status: She is alert and oriented to person, place, and time  Psychiatric:         Mood and Affect: Mood normal          Behavior: Behavior normal        Discussion with Family: significant other at bedside    Discharge instructions/Information to patient and family:   See after visit summary for information provided to patient and family  Provisions for Follow-Up Care:  See after visit summary for information related to follow-up care and any pertinent home health orders  Disposition:     Home    For Discharges to [facility] SNF:   · Not Applicable to this Patient - Not Applicable to this Patient    Planned Readmission:  None     Discharge Statement:  I spent 30 minutes discharging the patient  This time was spent on the day of discharge  I had direct contact with the patient on the day of discharge   Greater than 50% of the total time was spent examining patient, answering all patient questions, arranging and discussing plan of care with patient as well as directly providing post-discharge instructions  Additional time then spent on discharge activities  Discharge Medications:  See after visit summary for reconciled discharge medications provided to patient and family        ** Please Note: This note has been constructed using a voice recognition system **

## 2021-03-04 ENCOUNTER — OFFICE VISIT (OUTPATIENT)
Dept: PODIATRY | Facility: CLINIC | Age: 51
End: 2021-03-04

## 2021-03-04 VITALS
DIASTOLIC BLOOD PRESSURE: 70 MMHG | HEIGHT: 61 IN | SYSTOLIC BLOOD PRESSURE: 115 MMHG | WEIGHT: 146.6 LBS | BODY MASS INDEX: 27.68 KG/M2 | HEART RATE: 72 BPM

## 2021-03-04 DIAGNOSIS — M20.21 HALLUX RIGIDUS OF RIGHT FOOT: Primary | ICD-10-CM

## 2021-03-04 PROCEDURE — 99024 POSTOP FOLLOW-UP VISIT: CPT | Performed by: PODIATRIST

## 2021-03-04 NOTE — PROGRESS NOTES
Patient presents approximately 2 5 months post implant arthroplasty right great toe joint  Patient still having pain with shoe pressure on the dorsum of the 1st metatarsal head  She states that she can only wear shoes for approximately 1 hour  It is noted that she is wearing a running shoe that has no give which may be causing her continued pain  She was told to return to a running shoe with a mesh top cover  She will be reassessed in 4 weeks  She is unable to take ibuprofen due to gallbladder issues  She is scheduled for gallbladder surgery in the near future

## 2021-03-06 NOTE — PROGRESS NOTES
This is an addendum of physical exams performed on 2/25/21  Physical Exam  Vitals signs and nursing note reviewed  Constitutional:       Appearance: Normal appearance  HENT:      Head: Normocephalic and atraumatic  Right Ear: External ear normal       Left Ear: External ear normal       Nose: Nose normal       Mouth/Throat:      Mouth: Mucous membranes are moist       Pharynx: Oropharynx is clear  Eyes:      Conjunctiva/sclera: Conjunctivae normal       Pupils: Pupils are equal, round, and reactive to light  Neck:      Musculoskeletal: Normal range of motion and neck supple  Cardiovascular:      Rate and Rhythm: Normal rate and regular rhythm  Pulses: Normal pulses  Heart sounds: Normal heart sounds  Pulmonary:      Effort: Pulmonary effort is normal       Breath sounds: Normal breath sounds  Abdominal:      General: Abdomen is flat  Bowel sounds are normal       Palpations: Abdomen is soft  Tenderness: There is right CVA tenderness  Musculoskeletal: Normal range of motion  Skin:     General: Skin is warm  Capillary Refill: Capillary refill takes less than 2 seconds  Neurological:      General: No focal deficit present  Mental Status: She is alert and oriented to person, place, and time  Mental status is at baseline  Psychiatric:         Mood and Affect: Mood normal          Behavior: Behavior normal          Thought Content:  Thought content normal          Judgment: Judgment normal

## 2021-03-12 ENCOUNTER — CONSULT (OUTPATIENT)
Dept: SURGERY | Facility: CLINIC | Age: 51
End: 2021-03-12
Payer: COMMERCIAL

## 2021-03-12 VITALS
HEART RATE: 78 BPM | DIASTOLIC BLOOD PRESSURE: 72 MMHG | BODY MASS INDEX: 25.68 KG/M2 | WEIGHT: 136 LBS | SYSTOLIC BLOOD PRESSURE: 118 MMHG | HEIGHT: 61 IN | TEMPERATURE: 97.3 F

## 2021-03-12 DIAGNOSIS — R10.31 RIGHT LOWER QUADRANT ABDOMINAL PAIN: Primary | ICD-10-CM

## 2021-03-12 DIAGNOSIS — K80.50 BILIARY COLIC SYMPTOM: ICD-10-CM

## 2021-03-12 PROCEDURE — 99213 OFFICE O/P EST LOW 20 MIN: CPT | Performed by: PHYSICIAN ASSISTANT

## 2021-03-12 NOTE — PROGRESS NOTES
Assessment/Plan:   Angelica Braxton is a 52 y  o female who is here for Gallbladder disease      pleasant 82-year-old female who was hospitalized at [place] [address] for upper abdominal pain and intractable nausea, vomiting  Abdominal ultrasound with no gallstones  Patient pain resolved with pain medicines and muscle relaxants  Patient was also seen by GI in the hospital    outpatient HIDA scan at [facility] cyst suggest delayed emptying of her gallbladder at 3 hours  No  gallbladder ejection fraction calculated  Study suggests chronic cholecystitis  Upon evaluation today patient has: Bilary colic      Plan:   Would follow up with GI to ensure no other pathology for upper abdominal pain  Probable upper endoscopy  Plan Laparoscopic Cholecystectomy with possible Intraoperative Cholangiogram  Possible Robotic assisted      Post Op Pain Management: Norco      Ultrasound findings:  Mildly enlarged liver measuring 16 5 centimeters  No gallbladder findings  Common bile duct measures 6 millimeters  No stones        Preoperative Clearance: None          Images:         ____________________________________________________    HPI:  Angelica Braxton is a 52 y  o female who was referred for evaluation of There were no encounter diagnoses  Abdominal pain Location: in the RUQ with radiation to back  Quality: pressure-like and sharp  Quantity: 9/10 in intensity  Chronicity: Onset 3 weeks ago, unchanged since   Aggravating factors: eating  Alleviating factors: NSAIDs and pain medication  Associated symptoms: nausea and vomiting, which is Intermittent  and over 1 month     nausea and vomiting,     regular bowel movement       Duration of pain or symptoms: Intermittent and over 1 month      Prior Colonoscopy : None     Prior Abdominal Surgery: none    Anticoagulation: none    Imaging:   No orders to display           LABS:    Lab Results   Component Value Date    K 3 8 02/26/2021     02/26/2021    CO2 24 02/26/2021    BUN 7 02/26/2021    CREATININE 0 58 (L) 02/26/2021    CALCIUM 8 2 (L) 02/26/2021    CORRECTEDCA 9 2 02/26/2021    AST 10 02/26/2021    ALT 18 02/26/2021    ALKPHOS 69 02/26/2021    EGFR 109 02/26/2021       Lab Results   Component Value Date    WBC 7 02 02/26/2021    HGB 12 1 02/26/2021    HCT 37 8 02/26/2021    MCV 96 02/26/2021     02/26/2021     No results found for: INR, PROTIME  No results found for: HGBA1C        ROS:  General ROS: negative for - chills, fatigue, fever or night sweats, weight loss  Respiratory ROS: no cough, shortness of breath, or wheezing  Cardiovascular ROS: no chest pain or dyspnea on exertion  Genito-Urinary ROS: no dysuria, trouble voiding, or hematuria  Musculoskeletal ROS: negative for - gait disturbance, joint pain or muscle pain  Neurological ROS: no TIA or stroke symptoms  Gastrointestinal ROS: See HPI   GI ROS: see HPI  Skin ROS: no new rashes or lesions   Lymphatic ROS: no new adenopathy noted by pt     GYN ROS: see HPI, no new GYN history or bleeding noted  Psy ROS: no new mental or behavioral disturbances       Patient Active Problem List   Diagnosis    Irregular menses    Smoker    Migraine    Intractable pain    Leukocytosis         Allergies:  Erythromycin, Pollen extract, Tetracycline, and Levofloxacin      Current Outpatient Medications:     methocarbamol (ROBAXIN) 500 mg tablet, Take 1 tablet (500 mg total) by mouth every 6 (six) hours as needed for muscle spasms for up to 10 days, Disp: 40 tablet, Rfl: 0    polyethylene glycol (MIRALAX) 17 g packet, Take 17 g by mouth daily (Patient not taking: Reported on 3/12/2021), Disp: 30 each, Rfl: 0    Past Medical History:   Diagnosis Date    Arthritis     Gestational diabetes     Hyperlipidemia     Migraine     Seasonal allergies        Past Surgical History:   Procedure Laterality Date    BREAST BIOPSY Right 2016    benign    CARPAL TUNNEL RELEASE Right 2005    MANDIBLE FRACTURE SURGERY      jaw surgery    [place] HALLUX RIGIDUS W/CHEILECTOMY 1ST MP JT W/IMPLT Right 12/18/2020    Procedure: BUNIONECTOMY WITH IMPLANT;  Surgeon: Kelly Irene DPM;  Location: [facility];  Service: Podiatry    TOE SURGERY Left     TONSILLECTOMY         Family History   Problem Relation Age of Onset    Hypothyroidism Mother     Cancer Father     Prostate cancer Father     Cancer Family     Heart disease Family     Hyperlipidemia Family         pure    No Known Problems Maternal Grandmother     No Known Problems Paternal Grandmother     No Known Problems Maternal Aunt     No Known Problems Paternal Aunt     No Known Problems Paternal Aunt     No Known Problems Paternal Aunt         reports that she has been smoking cigarettes  She has been smoking about 0 50 packs per day  She has never used smokeless tobacco  She reports current alcohol use  She reports that she does not use drugs  Exam:   Vitals:    03/12/21 1022   BP: 118/72   Pulse: 78   Temp: (!) 97 3 °F (36 3 °C)       PHYSICAL EXAM  General Appearance:    Alert, cooperative, no distress, healthy   Head:    Normocephalic without obvious abnormality   Eyes:    PERRL, conjunctiva/corneas clear,       Neck:   Supple, no adenopathy, no JVD   Back:     Symmetric, no spinal or CVA tenderness   Lungs:     Clear to auscultation bilaterally, no wheezing or rhonchi   Heart:    Regular rate and rhythm, S1 and S2 normal, no murmur   Abdomen:     abdomen is soft without significant tenderness, masses, organomegaly or guarding Bowel sounds are normal      Extremities:   Extremities normal  No clubbing, cyanosis or edema   Psych:   Normal Affect, AOx3  Neurologic:  Skin:   CNII-XII intact  Strength symmetric, speech intact    Warm, dry, intact, no visible rashes or lesions      Informed consent for procedure was personally discussed, reviewed, and signed by Dr Celsa Galo   Discussion by Dr Celsa Galo was carried out regarding risks, benefits, and alternatives with the patient  Risks include but are not limited to:  bleeding, infection, and delayed wound healing or an open wound, pulmonary embolus, leaks from bowel or bile ducts or other viscus, transfusions, death  Discussed in further detail the more common complications and their rates of occurrence   was used if necessary  Patient expressed understanding of the issues discussed and wished/consented to proceed  All questions were answered by Dr Senia Haskins  Discussion performed between patient and the provider signing below  Signature:   Vida Roman    Date: 3/12/2021 Time: 10:52 AM                          Some portions of this record may have been generated with voice recognition software  There may be translation, syntax,  or grammatical errors  Occasional wrong word or "sound-a-like" substitutions may have occurred due to the inherent limitations of the voice recognition software  Read the chart carefully and recognize, using context, where substitutions may have occurred  If you have any questions, please contact the dictating provider for clarification or correction, as needed  This encounter has been coded by a non-certified coder

## 2021-04-08 ENCOUNTER — TELEPHONE (OUTPATIENT)
Dept: OTHER | Facility: OTHER | Age: 51
End: 2021-04-08

## 2021-04-26 ENCOUNTER — OFFICE VISIT (OUTPATIENT)
Dept: SURGERY | Facility: CLINIC | Age: 51
End: 2021-04-26
Payer: COMMERCIAL

## 2021-04-26 VITALS
HEART RATE: 79 BPM | SYSTOLIC BLOOD PRESSURE: 102 MMHG | WEIGHT: 136 LBS | DIASTOLIC BLOOD PRESSURE: 72 MMHG | BODY MASS INDEX: 25.68 KG/M2 | HEIGHT: 61 IN | TEMPERATURE: 97.3 F

## 2021-04-26 DIAGNOSIS — K81.9 ACALCULOUS CHOLECYSTITIS: Primary | ICD-10-CM

## 2021-04-26 PROCEDURE — 99213 OFFICE O/P EST LOW 20 MIN: CPT | Performed by: SURGERY

## 2021-04-26 NOTE — H&P (VIEW-ONLY)
Assessment/Plan:   Zenia Unger is a 52 y  o female who is here for Gallbladder disease         Upon evaluation today patient has: Chronic Cholecystitis      Plan: Laparoscopic Cholecystectomy with possible Intraoperative Cholangiogram  Possible Robotic assisted      Post Op Pain Management: Norco      Ultrasound findings:  Mildly enlarged liver 6 5 cm  No gallstones  HIDA scan at [facility]  Significantly 3 hour delayed visualization of the gallbladder but no  ejection fraction noted  We discussed that non stone gallbladder disease has a higher risk of irritable bowel including frequent bowel movements crampy abdominal pain or changes in bowel habits  Certainly at the minority of patients less than 8% that get this postop and is often self-limited by time and cholestyramine administration  It is  rarely long-term  However, will go ahead and proceed with a laparoscopic cholecystectomy as much as a diagnostic tool as it is a therapeutic till  Not all symptoms will be relieved but I do believe has some degree of her upper GI symptoms will be addressed  She has expressed a thorough understanding of the pros and cons of this discussion and proceeding with surgery  Preoperative Clearance: None          Images:         ____________________________________________________    HPI:  Zenia Unger is a 52 y  o female who was referred for evaluation of There were no encounter diagnoses  Abdominal pain Location: in the RUQ with radiation to back, which is Intermittent  and over 3 months     no nausea and no vomiting,     regular bowel movement       Duration of pain or symptoms: Intermittent      Prior Colonoscopy : None     Prior Abdominal Surgery:     Anticoagulation: none    Imaging:   No orders to display           LABS:    Lab Results   Component Value Date    K 3 8 02/26/2021     02/26/2021    CO2 24 02/26/2021    BUN 7 02/26/2021    CREATININE 0 58 (L) 02/26/2021    CALCIUM 8 2 (L) 02/26/2021    CORRECTEDCA 9 2 02/26/2021    AST 10 02/26/2021    ALT 18 02/26/2021    ALKPHOS 69 02/26/2021    EGFR 109 02/26/2021       Lab Results   Component Value Date    WBC 7 02 02/26/2021    HGB 12 1 02/26/2021    HCT 37 8 02/26/2021    MCV 96 02/26/2021     02/26/2021     No results found for: INR, PROTIME  No results found for: HGBA1C        ROS:  General ROS: negative for - chills, fatigue, fever or night sweats, weight loss  Respiratory ROS: no cough, shortness of breath, or wheezing  Cardiovascular ROS: no chest pain or dyspnea on exertion  Genito-Urinary ROS: no dysuria, trouble voiding, or hematuria  Musculoskeletal ROS: negative for - gait disturbance, joint pain or muscle pain  Neurological ROS: no TIA or stroke symptoms  Gastrointestinal ROS: See HPI   GI ROS: see HPI  Skin ROS: no new rashes or lesions   Lymphatic ROS: no new adenopathy noted by pt     GYN ROS: see HPI, no new GYN history or bleeding noted  Psy ROS: no new mental or behavioral disturbances       Patient Active Problem List   Diagnosis    Irregular menses    Smoker    Migraine    Intractable pain    Leukocytosis         Allergies:  Erythromycin, Pollen extract, Tetracycline, and Levofloxacin      Current Outpatient Medications:     methocarbamol (ROBAXIN) 500 mg tablet, Take 1 tablet (500 mg total) by mouth every 6 (six) hours as needed for muscle spasms for up to 10 days, Disp: 40 tablet, Rfl: 0    polyethylene glycol (MIRALAX) 17 g packet, Take 17 g by mouth daily (Patient not taking: Reported on 3/12/2021), Disp: 30 each, Rfl: 0    Past Medical History:   Diagnosis Date    Arthritis     Gestational diabetes     Hyperlipidemia     Migraine     Seasonal allergies        Past Surgical History:   Procedure Laterality Date    BREAST BIOPSY Right 2016    benign    CARPAL TUNNEL RELEASE Right 2005    MANDIBLE FRACTURE SURGERY      jaw surgery    [place] HALLUX RIGIDUS W/CHEILECTOMY 1ST MP JT W/IMPLT Right 12/18/2020    Procedure: BUNIONECTOMY WITH IMPLANT;  Surgeon: Mel Conte DPM;  Location: [facility];  Service: Podiatry    TOE SURGERY Left     TONSILLECTOMY         Family History   Problem Relation Age of Onset    Hypothyroidism Mother     Cancer Father     Prostate cancer Father     Cancer Family     Heart disease Family     Hyperlipidemia Family         pure    No Known Problems Maternal Grandmother     No Known Problems Paternal Grandmother     No Known Problems Maternal Aunt     No Known Problems Paternal Aunt     No Known Problems Paternal Aunt     No Known Problems Paternal Aunt         reports that she has been smoking cigarettes  She has been smoking about 0 50 packs per day  She has never used smokeless tobacco  She reports current alcohol use  She reports that she does not use drugs  Exam:   Vitals:    04/26/21 1113   BP: 102/72   Pulse: 79   Temp: (!) 97 3 °F (36 3 °C)       PHYSICAL EXAM  General Appearance:    Alert, cooperative, no distress,    Head:    Normocephalic without obvious abnormality   Eyes:    PERRL, conjunctiva/corneas clear,       Neck:   Supple, no adenopathy, no JVD   Back:     Symmetric, no spinal or CVA tenderness   Lungs:     Clear to auscultation bilaterally, no wheezing or rhonchi   Heart:    Regular rate and rhythm, S1 and S2 normal, no murmur   Abdomen:     abdomen is soft without significant tenderness, masses, organomegaly or guarding Bowel sounds are normal      Extremities:   Extremities normal  No clubbing, cyanosis or edema   Psych:   Normal Affect, AOx3  Neurologic:  Skin:   CNII-XII intact  Strength symmetric, speech intact    Warm, dry, intact, no visible rashes or lesions      Informed consent for procedure was personally discussed, reviewed, and signed by Dr Yasir Montoya  Discussion by Dr Yasir Montoya was carried out regarding risks, benefits, and alternatives with the patient   Risks include but are not limited to:  bleeding, infection, and delayed wound healing or an open wound, pulmonary embolus, leaks from bowel or bile ducts or other viscus, transfusions, death  Discussed in further detail the more common complications and their rates of occurrence   was used if necessary  Patient expressed understanding of the issues discussed and wished/consented to proceed  All questions were answered by Dr Estevan Colon  Discussion performed between patient and the provider signing below  Signature:   Roxi Shetty MD    Date: 4/26/2021 Time: 11:20 AM                          Some portions of this record may have been generated with voice recognition software  There may be translation, syntax,  or grammatical errors  Occasional wrong word or "sound-a-like" substitutions may have occurred due to the inherent limitations of the voice recognition software  Read the chart carefully and recognize, using context, where substitutions may have occurred  If you have any questions, please contact the dictating provider for clarification or correction, as needed  This encounter has been coded by a non-certified coder

## 2021-04-26 NOTE — PROGRESS NOTES
Assessment/Plan:   Judy Jiménez is a 52 y  o female who is here for Gallbladder disease         Upon evaluation today patient has: Chronic Cholecystitis      Plan: Laparoscopic Cholecystectomy with possible Intraoperative Cholangiogram  Possible Robotic assisted      Post Op Pain Management: Norco      Ultrasound findings:  Mildly enlarged liver 6 5 cm  No gallstones  HIDA scan at [facility]  Significantly 3 hour delayed visualization of the gallbladder but no  ejection fraction noted  We discussed that non stone gallbladder disease has a higher risk of irritable bowel including frequent bowel movements crampy abdominal pain or changes in bowel habits  Certainly at the minority of patients less than 8% that get this postop and is often self-limited by time and cholestyramine administration  It is  rarely long-term  However, will go ahead and proceed with a laparoscopic cholecystectomy as much as a diagnostic tool as it is a therapeutic till  Not all symptoms will be relieved but I do believe has some degree of her upper GI symptoms will be addressed  She has expressed a thorough understanding of the pros and cons of this discussion and proceeding with surgery  Preoperative Clearance: None          Images:         ____________________________________________________    HPI:  Judy Jiménez is a 52 y  o female who was referred for evaluation of There were no encounter diagnoses  Abdominal pain Location: in the RUQ with radiation to back, which is Intermittent  and over 3 months     no nausea and no vomiting,     regular bowel movement       Duration of pain or symptoms: Intermittent      Prior Colonoscopy : None     Prior Abdominal Surgery:     Anticoagulation: none    Imaging:   No orders to display           LABS:    Lab Results   Component Value Date    K 3 8 02/26/2021     02/26/2021    CO2 24 02/26/2021    BUN 7 02/26/2021    CREATININE 0 58 (L) 02/26/2021    CALCIUM 8 2 (L) 02/26/2021    CORRECTEDCA 9 2 02/26/2021    AST 10 02/26/2021    ALT 18 02/26/2021    ALKPHOS 69 02/26/2021    EGFR 109 02/26/2021       Lab Results   Component Value Date    WBC 7 02 02/26/2021    HGB 12 1 02/26/2021    HCT 37 8 02/26/2021    MCV 96 02/26/2021     02/26/2021     No results found for: INR, PROTIME  No results found for: HGBA1C        ROS:  General ROS: negative for - chills, fatigue, fever or night sweats, weight loss  Respiratory ROS: no cough, shortness of breath, or wheezing  Cardiovascular ROS: no chest pain or dyspnea on exertion  Genito-Urinary ROS: no dysuria, trouble voiding, or hematuria  Musculoskeletal ROS: negative for - gait disturbance, joint pain or muscle pain  Neurological ROS: no TIA or stroke symptoms  Gastrointestinal ROS: See HPI   GI ROS: see HPI  Skin ROS: no new rashes or lesions   Lymphatic ROS: no new adenopathy noted by pt     GYN ROS: see HPI, no new GYN history or bleeding noted  Psy ROS: no new mental or behavioral disturbances       Patient Active Problem List   Diagnosis    Irregular menses    Smoker    Migraine    Intractable pain    Leukocytosis         Allergies:  Erythromycin, Pollen extract, Tetracycline, and Levofloxacin      Current Outpatient Medications:     methocarbamol (ROBAXIN) 500 mg tablet, Take 1 tablet (500 mg total) by mouth every 6 (six) hours as needed for muscle spasms for up to 10 days, Disp: 40 tablet, Rfl: 0    polyethylene glycol (MIRALAX) 17 g packet, Take 17 g by mouth daily (Patient not taking: Reported on 3/12/2021), Disp: 30 each, Rfl: 0    Past Medical History:   Diagnosis Date    Arthritis     Gestational diabetes     Hyperlipidemia     Migraine     Seasonal allergies        Past Surgical History:   Procedure Laterality Date    BREAST BIOPSY Right 2016    benign    CARPAL TUNNEL RELEASE Right 2005    MANDIBLE FRACTURE SURGERY      jaw surgery    [place] HALLUX RIGIDUS W/CHEILECTOMY 1ST MP JT W/IMPLT Right 12/18/2020    Procedure: BUNIONECTOMY WITH IMPLANT;  Surgeon: Nisha Sales DPM;  Location: [facility];  Service: Podiatry    TOE SURGERY Left     TONSILLECTOMY         Family History   Problem Relation Age of Onset    Hypothyroidism Mother     Cancer Father     Prostate cancer Father     Cancer Family     Heart disease Family     Hyperlipidemia Family         pure    No Known Problems Maternal Grandmother     No Known Problems Paternal Grandmother     No Known Problems Maternal Aunt     No Known Problems Paternal Aunt     No Known Problems Paternal Aunt     No Known Problems Paternal Aunt         reports that she has been smoking cigarettes  She has been smoking about 0 50 packs per day  She has never used smokeless tobacco  She reports current alcohol use  She reports that she does not use drugs  Exam:   Vitals:    04/26/21 1113   BP: 102/72   Pulse: 79   Temp: (!) 97 3 °F (36 3 °C)       PHYSICAL EXAM  General Appearance:    Alert, cooperative, no distress,    Head:    Normocephalic without obvious abnormality   Eyes:    PERRL, conjunctiva/corneas clear,       Neck:   Supple, no adenopathy, no JVD   Back:     Symmetric, no spinal or CVA tenderness   Lungs:     Clear to auscultation bilaterally, no wheezing or rhonchi   Heart:    Regular rate and rhythm, S1 and S2 normal, no murmur   Abdomen:     abdomen is soft without significant tenderness, masses, organomegaly or guarding Bowel sounds are normal      Extremities:   Extremities normal  No clubbing, cyanosis or edema   Psych:   Normal Affect, AOx3  Neurologic:  Skin:   CNII-XII intact  Strength symmetric, speech intact    Warm, dry, intact, no visible rashes or lesions      Informed consent for procedure was personally discussed, reviewed, and signed by Dr Rosa Maria Taylor  Discussion by Dr Rosa Maria Taylor was carried out regarding risks, benefits, and alternatives with the patient   Risks include but are not limited to:  bleeding, infection, and delayed wound healing or an open wound, pulmonary embolus, leaks from bowel or bile ducts or other viscus, transfusions, death  Discussed in further detail the more common complications and their rates of occurrence   was used if necessary  Patient expressed understanding of the issues discussed and wished/consented to proceed  All questions were answered by Dr Velia Nazario  Discussion performed between patient and the provider signing below  Signature:   Charmayne Honey, MD    Date: 4/26/2021 Time: 11:20 AM                          Some portions of this record may have been generated with voice recognition software  There may be translation, syntax,  or grammatical errors  Occasional wrong word or "sound-a-like" substitutions may have occurred due to the inherent limitations of the voice recognition software  Read the chart carefully and recognize, using context, where substitutions may have occurred  If you have any questions, please contact the dictating provider for clarification or correction, as needed  This encounter has been coded by a non-certified coder

## 2021-05-04 ENCOUNTER — OFFICE VISIT (OUTPATIENT)
Dept: PODIATRY | Facility: CLINIC | Age: 51
End: 2021-05-04
Payer: COMMERCIAL

## 2021-05-04 VITALS
WEIGHT: 134.8 LBS | BODY MASS INDEX: 25.45 KG/M2 | HEART RATE: 79 BPM | SYSTOLIC BLOOD PRESSURE: 124 MMHG | DIASTOLIC BLOOD PRESSURE: 82 MMHG | HEIGHT: 61 IN

## 2021-05-04 DIAGNOSIS — M20.21 HALLUX RIGIDUS OF RIGHT FOOT: Primary | ICD-10-CM

## 2021-05-04 PROCEDURE — 99212 OFFICE O/P EST SF 10 MIN: CPT | Performed by: PODIATRIST

## 2021-05-04 NOTE — PROGRESS NOTES
Patient presents approximately 5 months post implant arthroplasty right 1st MPJ  Patient is now wearing most shoes comfortably  Minimal discomfort is related  No additional treatment is needed  Reappoint p r n

## 2021-05-10 NOTE — PRE-PROCEDURE INSTRUCTIONS
No outpatient medications have been marked as taking for the 5/13/21 encounter [facility] Encounter)  Instructed has no medications to take the morning of surgery  No aspirin, NSAIDs, vitamins, or supplements 1 week before surgery

## 2021-05-11 ENCOUNTER — ANESTHESIA EVENT (OUTPATIENT)
Dept: PERIOP | Facility: HOSPITAL | Age: 51
End: 2021-05-11
Payer: COMMERCIAL

## 2021-05-12 PROBLEM — K21.9 GASTROESOPHAGEAL REFLUX DISEASE: Status: ACTIVE | Noted: 2021-05-12

## 2021-05-12 PROBLEM — M19.90 ARTHRITIS: Status: ACTIVE | Noted: 2021-05-12

## 2021-05-13 ENCOUNTER — ANESTHESIA (OUTPATIENT)
Dept: PERIOP | Facility: HOSPITAL | Age: 51
End: 2021-05-13
Payer: COMMERCIAL

## 2021-05-13 ENCOUNTER — HOSPITAL ENCOUNTER (OUTPATIENT)
Facility: HOSPITAL | Age: 51
Setting detail: OUTPATIENT SURGERY
Discharge: HOME | End: 2021-05-13
Attending: SURGERY | Admitting: SURGERY
Payer: COMMERCIAL

## 2021-05-13 ENCOUNTER — APPOINTMENT (OUTPATIENT)
Dept: RADIOLOGY | Facility: HOSPITAL | Age: 51
End: 2021-05-13
Payer: COMMERCIAL

## 2021-05-13 VITALS
HEART RATE: 67 BPM | BODY MASS INDEX: 24.35 KG/M2 | DIASTOLIC BLOOD PRESSURE: 82 MMHG | HEIGHT: 61 IN | SYSTOLIC BLOOD PRESSURE: 114 MMHG | OXYGEN SATURATION: 100 % | TEMPERATURE: 98.2 F | RESPIRATION RATE: 18 BRPM | WEIGHT: 129 LBS

## 2021-05-13 DIAGNOSIS — K81.9 ACALCULOUS CHOLECYSTITIS: Primary | ICD-10-CM

## 2021-05-13 PROCEDURE — 88304 TISSUE EXAM BY PATHOLOGIST: CPT | Performed by: PATHOLOGY

## 2021-05-13 PROCEDURE — 47562 LAPAROSCOPIC CHOLECYSTECTOMY: CPT | Performed by: PHYSICIAN ASSISTANT

## 2021-05-13 PROCEDURE — 47562 LAPAROSCOPIC CHOLECYSTECTOMY: CPT | Performed by: SURGERY

## 2021-05-13 RX ORDER — MAGNESIUM HYDROXIDE 1200 MG/15ML
LIQUID ORAL AS NEEDED
Status: DISCONTINUED | OUTPATIENT
Start: 2021-05-13 | End: 2021-05-13 | Stop reason: HOSPADM

## 2021-05-13 RX ORDER — SODIUM CHLORIDE, SODIUM LACTATE, POTASSIUM CHLORIDE, CALCIUM CHLORIDE 600; 310; 30; 20 MG/100ML; MG/100ML; MG/100ML; MG/100ML
125 INJECTION, SOLUTION INTRAVENOUS CONTINUOUS
Status: DISCONTINUED | OUTPATIENT
Start: 2021-05-13 | End: 2021-05-13 | Stop reason: HOSPADM

## 2021-05-13 RX ORDER — LIDOCAINE HYDROCHLORIDE 20 MG/ML
INJECTION, SOLUTION EPIDURAL; INFILTRATION; INTRACAUDAL; PERINEURAL AS NEEDED
Status: DISCONTINUED | OUTPATIENT
Start: 2021-05-13 | End: 2021-05-13

## 2021-05-13 RX ORDER — ONDANSETRON 2 MG/ML
4 INJECTION INTRAMUSCULAR; INTRAVENOUS EVERY 6 HOURS PRN
Status: DISCONTINUED | OUTPATIENT
Start: 2021-05-13 | End: 2021-05-13 | Stop reason: HOSPADM

## 2021-05-13 RX ORDER — FENTANYL CITRATE 50 UG/ML
INJECTION, SOLUTION INTRAMUSCULAR; INTRAVENOUS AS NEEDED
Status: DISCONTINUED | OUTPATIENT
Start: 2021-05-13 | End: 2021-05-13

## 2021-05-13 RX ORDER — MIDAZOLAM HYDROCHLORIDE 2 MG/2ML
INJECTION, SOLUTION INTRAMUSCULAR; INTRAVENOUS AS NEEDED
Status: DISCONTINUED | OUTPATIENT
Start: 2021-05-13 | End: 2021-05-13

## 2021-05-13 RX ORDER — SODIUM CHLORIDE 9 MG/ML
INJECTION, SOLUTION INTRAVENOUS AS NEEDED
Status: DISCONTINUED | OUTPATIENT
Start: 2021-05-13 | End: 2021-05-13 | Stop reason: HOSPADM

## 2021-05-13 RX ORDER — HYDROCODONE BITARTRATE AND ACETAMINOPHEN 5; 325 MG/1; MG/1
1 TABLET ORAL EVERY 6 HOURS PRN
Status: DISCONTINUED | OUTPATIENT
Start: 2021-05-13 | End: 2021-05-13 | Stop reason: HOSPADM

## 2021-05-13 RX ORDER — HYDROMORPHONE HCL/PF 1 MG/ML
0.5 SYRINGE (ML) INJECTION
Status: DISCONTINUED | OUTPATIENT
Start: 2021-05-13 | End: 2021-05-13 | Stop reason: HOSPADM

## 2021-05-13 RX ORDER — ROCURONIUM BROMIDE 10 MG/ML
INJECTION, SOLUTION INTRAVENOUS AS NEEDED
Status: DISCONTINUED | OUTPATIENT
Start: 2021-05-13 | End: 2021-05-13

## 2021-05-13 RX ORDER — KETOROLAC TROMETHAMINE 30 MG/ML
INJECTION, SOLUTION INTRAMUSCULAR; INTRAVENOUS AS NEEDED
Status: DISCONTINUED | OUTPATIENT
Start: 2021-05-13 | End: 2021-05-13

## 2021-05-13 RX ORDER — HYDROMORPHONE HCL/PF 1 MG/ML
SYRINGE (ML) INJECTION AS NEEDED
Status: DISCONTINUED | OUTPATIENT
Start: 2021-05-13 | End: 2021-05-13

## 2021-05-13 RX ORDER — ONDANSETRON 2 MG/ML
4 INJECTION INTRAMUSCULAR; INTRAVENOUS ONCE AS NEEDED
Status: COMPLETED | OUTPATIENT
Start: 2021-05-13 | End: 2021-05-13

## 2021-05-13 RX ORDER — INDOCYANINE GREEN AND WATER 25 MG
25 KIT INJECTION ONCE
Status: COMPLETED | OUTPATIENT
Start: 2021-05-13 | End: 2021-05-13

## 2021-05-13 RX ORDER — CEFAZOLIN SODIUM 2 G/50ML
2000 SOLUTION INTRAVENOUS ONCE
Status: COMPLETED | OUTPATIENT
Start: 2021-05-13 | End: 2021-05-13

## 2021-05-13 RX ORDER — PROPOFOL 10 MG/ML
INJECTION, EMULSION INTRAVENOUS AS NEEDED
Status: DISCONTINUED | OUTPATIENT
Start: 2021-05-13 | End: 2021-05-13

## 2021-05-13 RX ORDER — ONDANSETRON 2 MG/ML
INJECTION INTRAMUSCULAR; INTRAVENOUS AS NEEDED
Status: DISCONTINUED | OUTPATIENT
Start: 2021-05-13 | End: 2021-05-13

## 2021-05-13 RX ORDER — MEPERIDINE HYDROCHLORIDE 25 MG/ML
12.5 INJECTION INTRAMUSCULAR; INTRAVENOUS; SUBCUTANEOUS
Status: DISCONTINUED | OUTPATIENT
Start: 2021-05-13 | End: 2021-05-13 | Stop reason: HOSPADM

## 2021-05-13 RX ORDER — HYDROCODONE BITARTRATE AND ACETAMINOPHEN 5; 325 MG/1; MG/1
1 TABLET ORAL EVERY 6 HOURS PRN
Qty: 5 TABLET | Refills: 0 | Status: SHIPPED | OUTPATIENT
Start: 2021-05-13

## 2021-05-13 RX ORDER — FENTANYL CITRATE/PF 50 MCG/ML
25 SYRINGE (ML) INJECTION
Status: DISCONTINUED | OUTPATIENT
Start: 2021-05-13 | End: 2021-05-13 | Stop reason: HOSPADM

## 2021-05-13 RX ORDER — PROMETHAZINE HYDROCHLORIDE 25 MG/ML
12.5 INJECTION, SOLUTION INTRAMUSCULAR; INTRAVENOUS
Status: DISCONTINUED | OUTPATIENT
Start: 2021-05-13 | End: 2021-05-13 | Stop reason: HOSPADM

## 2021-05-13 RX ADMIN — PHENYLEPHRINE HYDROCHLORIDE 25 MCG: 10 INJECTION INTRAVENOUS at 08:03

## 2021-05-13 RX ADMIN — FENTANYL CITRATE 25 MCG: 50 INJECTION INTRAMUSCULAR; INTRAVENOUS at 09:43

## 2021-05-13 RX ADMIN — PHENYLEPHRINE HYDROCHLORIDE 50 MCG: 10 INJECTION INTRAVENOUS at 07:41

## 2021-05-13 RX ADMIN — ROCURONIUM BROMIDE 20 MG: 10 INJECTION, SOLUTION INTRAVENOUS at 07:54

## 2021-05-13 RX ADMIN — FENTANYL CITRATE 25 MCG: 50 INJECTION INTRAMUSCULAR; INTRAVENOUS at 09:26

## 2021-05-13 RX ADMIN — INDOCYANINE GREEN AND WATER 25 MG: KIT at 06:37

## 2021-05-13 RX ADMIN — PHENYLEPHRINE HYDROCHLORIDE 50 MCG: 10 INJECTION INTRAVENOUS at 08:06

## 2021-05-13 RX ADMIN — SODIUM CHLORIDE, SODIUM LACTATE, POTASSIUM CHLORIDE, AND CALCIUM CHLORIDE 125 ML/HR: .6; .31; .03; .02 INJECTION, SOLUTION INTRAVENOUS at 06:35

## 2021-05-13 RX ADMIN — FENTANYL CITRATE 50 MCG: 50 INJECTION INTRAMUSCULAR; INTRAVENOUS at 08:57

## 2021-05-13 RX ADMIN — CEFAZOLIN SODIUM 2000 MG: 2 SOLUTION INTRAVENOUS at 07:23

## 2021-05-13 RX ADMIN — PHENYLEPHRINE HYDROCHLORIDE 50 MCG: 10 INJECTION INTRAVENOUS at 07:44

## 2021-05-13 RX ADMIN — ROCURONIUM BROMIDE 25 MG: 10 INJECTION, SOLUTION INTRAVENOUS at 08:28

## 2021-05-13 RX ADMIN — PROPOFOL 50 MG: 10 INJECTION, EMULSION INTRAVENOUS at 08:57

## 2021-05-13 RX ADMIN — SUGAMMADEX 250 MG: 100 INJECTION, SOLUTION INTRAVENOUS at 08:55

## 2021-05-13 RX ADMIN — HYDROMORPHONE HYDROCHLORIDE 0.5 MG: 1 INJECTION, SOLUTION INTRAMUSCULAR; INTRAVENOUS; SUBCUTANEOUS at 07:50

## 2021-05-13 RX ADMIN — PHENYLEPHRINE HYDROCHLORIDE 25 MCG: 10 INJECTION INTRAVENOUS at 08:00

## 2021-05-13 RX ADMIN — ONDANSETRON 4 MG: 2 INJECTION INTRAMUSCULAR; INTRAVENOUS at 09:12

## 2021-05-13 RX ADMIN — ONDANSETRON 4 MG: 2 INJECTION INTRAMUSCULAR; INTRAVENOUS at 07:54

## 2021-05-13 RX ADMIN — PROPOFOL 200 MG: 10 INJECTION, EMULSION INTRAVENOUS at 07:38

## 2021-05-13 RX ADMIN — KETOROLAC TROMETHAMINE 30 MG: 30 INJECTION, SOLUTION INTRAMUSCULAR at 08:58

## 2021-05-13 RX ADMIN — LIDOCAINE HYDROCHLORIDE 60 MG: 20 INJECTION, SOLUTION EPIDURAL; INFILTRATION; INTRACAUDAL; PERINEURAL at 07:38

## 2021-05-13 RX ADMIN — ROCURONIUM BROMIDE 30 MG: 10 INJECTION, SOLUTION INTRAVENOUS at 07:38

## 2021-05-13 RX ADMIN — FENTANYL CITRATE 50 MCG: 50 INJECTION INTRAMUSCULAR; INTRAVENOUS at 08:29

## 2021-05-13 RX ADMIN — SODIUM CHLORIDE, SODIUM LACTATE, POTASSIUM CHLORIDE, AND CALCIUM CHLORIDE: .6; .31; .03; .02 INJECTION, SOLUTION INTRAVENOUS at 07:58

## 2021-05-13 RX ADMIN — MIDAZOLAM 2 MG: 1 INJECTION INTRAMUSCULAR; INTRAVENOUS at 07:23

## 2021-05-13 NOTE — OP NOTE
CHOLECYSTECTOMY LAPAROSCOPIC W/ ROBOTICS; IOC  Postoperative Note  PATIENT NAME: Mac Chandler  : 1970  MRN: [id number]  [facility] ROOM 08    Surgery Date: 2021    Pre operative diagnosis:  Acalculous cholecystitis [K81 9]    Operative Indications:  Symptomatic gallbladder disease    Consent:  The risks, benefits, and alternatives to the surgery were discussed with the patient and with the family prior to surgery if present, personally by Dr Annabella Jay  If the consent was obtained by the physician assistant or other representative, the consent was reviewed once again personally by the operating physician  Common complications particular for this procedure as well as unusual complications were discussed, including but not limited to:  bleeding, wound infection, prolonged wound healing, open wounds, reoperation, leak from the bowel or viscus, leak from the bile duct or injury to adjacent or other organs or blood vessels in the abdomen  The significance of bile duct reconstruction was discussed  If the surgery was laparoscopic, it was discussed that possible open surgery could also occur during that same surgery and is always an option in laparoscopic surgery and/or reoperation  A  was used if necessary  The patient expressed understanding of the issues discussed and wished and consented to the procedure to proceed  All questions were answered  Dr Annabella Jay personally discussed the informed consent with this patient  Operative Findings:  Excellent critical view  Excellent ICG view    Post operative diagnosis:  Post-Op Diagnosis Codes:     * Acalculous cholecystitis [K81 9]    Procedure:   Procedure(s):  CHOLECYSTECTOMY LAPAROSCOPIC W/ ROBOTICS; IOC    Interpretation of fluorescein dye by surgeon              Surgeon(s) and Role:     * Isaac Rivera MD - Primary     * Daksha Mcfadden PA-C - Assisting 1st assist     * Nella Nath MD - resident learner         The First assistant/PA was medically necessary for surgical safety the case including suturing, retraction, and hemostasis  A qualified resident was not available for first assistance  I provided direct and immediate supervision  I was present for the entire procedure  Drains:  * No LDAs found *    Specimens:  ID Type Source Tests Collected by Time Destination   1 : gallbladder Tissue Gallbladder TISSUE EXAM Ruba Riddle MD 5/13/2021 0800        Estimated Blood Loss:   Minimal    Anesthesia Type:   Choice     Procedure: The patient was seen again in the Holding Room  The risks, benefits, complications, treatment options, and expected outcomes were discussed with the patient  The possibilities of reaction to medication, pulmonary aspiration, perforation of viscus, bleeding, recurrent infection, finding a normal gallbladder, the need for additional procedures, failure to diagnose a condition, the possible need to convert to an open procedure, and creating a complication requiring transfusion or operation were discussed with the patient  The patient and/or family concurred with the proposed plan, giving informed consent  The site of surgery properly noted/marked  The patient was taken to Operating Room, identified as Zenia Unger and the procedure verified as Laparoscopic Cholecystectomy with Possible Intraoperative Cholangiogram  A Time Out was held and the above information confirmed  Prior to the induction of general anesthesia, antibiotic prophylaxis was administered  General endotracheal anesthesia was then administered and tolerated well  After the induction, the abdomen was prepped in the usual sterile fashion  The patient was positioned in the supine position  The patient was positioned in the supine position, along with some reverse Trendelenburg  Local anesthetic agent was injected into the skin near the umbilicus and an incision made   The midline fascia was incised and the open technique was used to introduce a  port under direct vision  Pneumoperitoneum was then created with CO2 and tolerated well without any adverse changes in the patient's vital signs  Additional trocars were introduced under direct vision  All skin incisions were infiltrated with a local anesthetic agent before making the incision and placing the trocars  The patient was placed in the head up, left side down position  Robotic ports were used  The robot was then docked  The gallbladder was identified, the fundus grasped and retracted cephalad  Adhesions were lysed bluntly and with the electrocautery where indicated, taking care not to injure any adjacent organs or viscus  The infundibulum was grasped and retracted laterally, exposing the peritoneum overlying the triangle of Calot  The peritoneum was removed anteriorly and posteriorly to the gallbladder, with special attention to the backside of the gallbladder dissection  This allowed for freeing up the gallbladder  The critical view of the triangle Calot was carried out, dissecting out the cystic duct and cystic artery as the only two tubular structures leading to the gallbladder  Once these were clearly identified, the back wall of the gallbladder was lifted away from the cystic plate to expose the posterior aspect of this dissection, ensuring that there were no posterior structures leading into the liver  Fluorescein dye had been given to the patient preoperatively  Using the appropriate camera view, the common bile duct was visualized and well away from the cystic duct during the critical view  The cystic duct was then doubly ligated with Surgical Clips on the patient side and singly clipped on the gallbladder side and divided  The cystic artery was re-identified and ligated with clips and divided as well  The gallbladder was dissected from the liver bed in retrograde fashion with the electrocautery or harmonic scalpel where appropriate    The gallbladder was removed, via an Endo pouch, through the 10 mm port  The liver bed was irrigated and inspected  Hemostasis was achieved  Copious irrigation was utilized and was repeatedly aspirated until clear  The pneumoperitoneum was completely reduced after viewing removal of the trocars under direct vision  The wounds were thoroughly irrigated and the larger port site fascia was then closed with a figure of eight suture; the skin was then closed with 4-0 Monocryl sutures and a sterile dressing was applied  Instrument, sponge, and needle counts were correct at closure and at the conclusion of the case  The patient tolerated the procedure well  Some portions of this record may have been generated with voice recognition software  There may be translation, syntax,  or grammatical errors  Occasional wrong word or "sound-a-like" substitutions may have occurred due to the inherent limitations of the voice recognition software  Read the chart carefully and recognize, using context, where substations may have occurred  If you have any questions, please contact the dictating provider for clarification or correction, as needed         Complications: None    Condition: Stable to PACU    SIGNATURE: Sukhdev Maxwell MD   DATE: May 13, 2021   TIME: 9:02 AM

## 2021-05-13 NOTE — DISCHARGE INSTRUCTIONS
[facility] Instructions  Dr Kim Fofana MD, FACS    1  General: You will feel pulling sensations around the wound or funny aches and pains around the incisions  This is normal  Even minor surgery is a change in your body and this is your bodys way of reaction to it  If you have had abdominal surgery, it may help to support the incision with a small pillow or blanket for comfort when moving or coughing  2  Wound care: Make sure to remove the bandage in about 24 hours, unless instructed otherwise  You usually don't have to redress the wound after 24-48 hours, unless for comfort  Keep the incision clean and dry  Let air get to it  If this Steri-Strips fall off, just keep the wound clean  3  Water: You may shower over the wound, unless there are drain tubes left in place  Do not bathe or use a pool or hot tub until cleared by the physician  You may shower right over the staples or Steri-Strips and packing dry when you are done  4  Activity: You may go up and down stairs, walk as much as you are comfortable, but walk at least 3 times each day  If you have had abdominal surgery, do not lift anything heavier than 15 pounds for at least 2-4 weeks, unless cleared by the doctor  5  Diet: You may resume a regular diet  If you had a same-day surgery or overnight stay surgery, you may wish to eat lightly for a few days: soups, crackers, and sandwiches  You may resume a regular diet when ready  6  Medications: Resume all of your previous medications, unless told otherwise by the doctor  Avoid aspirin or ibuprofen (Advil, Motrin, etc ) products for 2-3 days after the date of surgery  You may, at that time, began to take them again  Tylenol is always fine, unless you are taking any narcotic pain medication containing Tylenol (such as Percocet, Darvocet, Vicodin, or anything containing acetaminophen)  Do not take Tylenol if you're taking these medications   You do not need to take the narcotic pain medications unless you are having significant pain and discomfort  7  Driving: You will need someone to drive you home on the day of surgery  Do not drive or make any important decisions while on narcotic pain medication or 24 hours and after anesthesia or sedation for surgery  Generally, you may drive when your off all narcotic pain medications  8  Upset Stomach: You may take Maalox, Tums, or similar items for an upset stomach  If your narcotic pain medication causes an upset stomach, do not take it on an empty stomach  Try taking it with at least some crackers or toast      9  Constipation: Patients often experienced constipation after surgery  You may take over-the-counter medication for this, such as Metamucil, Senokot, Dulcolax, milk of magnesia, etc  You may take a suppository unless you have had anorectal surgery such as a procedure on your hemorrhoids  If you experience significant nausea or vomiting after abdominal surgery, call the office before trying any of these medications  10  Call the office: If you are experiencing any of the following, fevers above 101 5°, significant nausea or vomiting, if the wound develops drainage and/or is excessive redness around the wound, or if you have significant diarrhea or other worsening symptoms  11  Pain: You may be given a prescription for pain  This will be given to the hospital, the day of surgery  12  Sexual Activity: You may resume sexual activity when you feel ready and comfortable and your incision is sealed and healed without apparent infection risk  13  Urination: If you haven't urinated in 6 hours, go directly to the ER for evaluation for urinary retention       Eileen Delaney , [address]  [place], [address]   Phone: [phone]

## 2021-05-13 NOTE — INTERVAL H&P NOTE
H&P reviewed  After examining the patient I find no changes in the patients condition since the H&P had been written      Vitals:    05/13/21 0631   BP: 104/69   Pulse:    Resp:    Temp:    SpO2:

## 2021-05-13 NOTE — ANESTHESIA PREPROCEDURE EVALUATION
Procedure:  CHOLECYSTECTOMY LAPAROSCOPIC W/ ROBOTICS; IOC (N/A Abdomen)    Relevant Problems   ANESTHESIA (within normal limits)      CARDIO (within normal limits)      ENDO (within normal limits)      GI/HEPATIC   (+) Gastroesophageal reflux disease      HEMATOLOGY (within normal limits)      MUSCULOSKELETAL   (+) Arthritis      NEURO/PSYCH (within normal limits)      PULMONARY   (+) Smoker        Physical Exam    Airway    Mallampati score: II  TM Distance: >3 FB  Neck ROM: full     Dental   No notable dental hx     Cardiovascular  Rhythm: regular, Rate: normal, Cardiovascular exam normal    Pulmonary  Pulmonary exam normal Breath sounds clear to auscultation,     Other Findings        Anesthesia Plan  ASA Score- 2     Anesthesia Type- general with ASA Monitors  Additional Monitors:   Airway Plan: ETT  Plan Factors-    Chart reviewed  Existing labs reviewed  Patient summary reviewed  Patient is a current smoker  Induction- intravenous  Postoperative Plan-     Informed Consent- Anesthetic plan and risks discussed with patient

## 2021-05-13 NOTE — ANESTHESIA POSTPROCEDURE EVALUATION
Post-Op Assessment Note    CV Status:  Stable    Pain management: adequate     Mental Status:  Alert and awake   Hydration Status:  Euvolemic   PONV Controlled:  Controlled   Airway Patency:  Patent      Post Op Vitals Reviewed: Yes      Staff: Anesthesiologist         No complications documented      /57 (05/13/21 0952)    Temp      Pulse 56 (05/13/21 0952)   Resp 19 (05/13/21 0952)    SpO2 95 % (05/13/21 0952)

## 2021-05-14 ENCOUNTER — TELEPHONE (OUTPATIENT)
Dept: SURGERY | Facility: CLINIC | Age: 51
End: 2021-05-14

## 2021-05-14 NOTE — TELEPHONE ENCOUNTER
S/P = Lap chace = 5/13/21    Called and spoke with patient  She denies any F/V/N/C and she did not have a bowel movement  Patient is aware to remove the dressing that is on top of the incision  She can let water and soap run over her incision  She is aware to keep the incision dry and clean  Patient is aware she will be receiving a call once her pathology has been finalized and verified  She is aware to call the office with any questions or concerns  Path pending

## 2021-05-17 NOTE — TELEPHONE ENCOUNTER
Pathology has been finalized and verified by provider  Called and left message for patient to return call to office  Final Diagnosis   A  Gallbladder,  cholecystectomy:       - Mild chronic cholecystitis  - Adenomyosis  - No dysplasia or malignancy is identified

## 2021-05-28 ENCOUNTER — OFFICE VISIT (OUTPATIENT)
Dept: SURGERY | Facility: CLINIC | Age: 51
End: 2021-05-28

## 2021-05-28 VITALS
SYSTOLIC BLOOD PRESSURE: 120 MMHG | DIASTOLIC BLOOD PRESSURE: 80 MMHG | TEMPERATURE: 97.4 F | BODY MASS INDEX: 25.11 KG/M2 | HEIGHT: 61 IN | WEIGHT: 133 LBS | HEART RATE: 82 BPM

## 2021-05-28 DIAGNOSIS — K81.9 ACALCULOUS CHOLECYSTITIS: Primary | ICD-10-CM

## 2021-05-28 PROCEDURE — 99024 POSTOP FOLLOW-UP VISIT: CPT | Performed by: PHYSICIAN ASSISTANT

## 2021-05-28 NOTE — LETTER
May 28, 2021     Patient: Maryuri Haider   YOB: 1970   Date of Visit: 5/28/2021       To Whom it May Concern:    Rafal Nath is under my professional care  She was seen in my office on 5/28/2021  She may return to work on June 2, 2021  If you have any questions or concerns, please don't hesitate to call           Sincerely,          Steve Romero PA-C        CC: No Recipients

## 2021-05-28 NOTE — PROGRESS NOTES
Assessment/Plan:   Kelvin Alexander is a 52 y  o female who comes in today for postoperative check after  Robotic assisted laparoscopic cholecystectomy      Patient is pleased with the outcome of surgery and is doing well  Chronic cholecystitis    Pathology: Reviewed with patient, all questions answered  sounds like patient has some mild irritable bowel symptoms after her gallbladder removal   Reassured patient that this still may resolve over time as her body adjusts  If continues or becomes worse would recommend follow-up with GI    ______________________________________________________  HPI:  Kelvin Alexander is a 52 y  o female who comes in today for postoperative check after recent   Robotic assisted laparoscopic cholecystectomy    Currently doing well with some problems :  Loose stool and intermittent crampy abdominal pain, no fever or chills,no nausea and no vomiting  Patient reports they have resumed their normal diet  denies biliary diarrhea  Reports  Some crampy abdominal pain related to some fatty or foods associated with diarrhea  ROS:  General ROS: negative for - chills, fatigue, fever or night sweats, weight loss  Respiratory ROS: no cough, shortness of breath, or wheezing  Cardiovascular ROS: no chest pain or dyspnea on exertion  Genito-Urinary ROS: no dysuria, trouble voiding, or hematuria  Musculoskeletal ROS: negative for - gait disturbance, joint pain or muscle pain  Neurological ROS: no TIA or stroke symptoms  GI ROS: see HPI  Skin ROS: no new rashes or lesions   Lymphatic ROS: no new adenopathy noted by pt     GYN ROS: see HPI, no new GYN history or bleeding noted  Psy ROS: no new mental or behavioral disturbances       Patient Active Problem List   Diagnosis    Irregular menses    Smoker    Migraine    Intractable pain    Leukocytosis    Gastroesophageal reflux disease    Arthritis           Allergies:  Erythromycin, Tetracycline, Pollen extract, and Levofloxacin      Current Outpatient Medications:     HYDROcodone-acetaminophen (NORCO) 5-325 mg per tablet, Take 1 tablet by mouth every 6 (six) hours as needed for pain for up to 5 dosesMax Daily Amount: 4 tablets (Patient not taking: Reported on 5/28/2021), Disp: 5 tablet, Rfl: 0    polyethylene glycol (MIRALAX) 17 g packet, Take 17 g by mouth daily (Patient not taking: Reported on 3/12/2021), Disp: 30 each, Rfl: 0    Past Medical History:   Diagnosis Date    Arthritis     Cholecystitis     lap chace  today  5/13/2021    GERD (gastroesophageal reflux disease)     Gestational diabetes     Hyperlipidemia     Migraine     Seasonal allergies        Past Surgical History:   Procedure Laterality Date    BREAST BIOPSY Right 2016    benign    CARPAL TUNNEL RELEASE Right 2005    MANDIBLE FRACTURE SURGERY      jaw surgery- 4 pins    KS HALLUX RIGIDUS W/CHEILECTOMY 1ST MP JT W/IMPLT Right 12/18/2020    Procedure: BUNIONECTOMY WITH IMPLANT;  Surgeon: Mere Montoya DPM;  Location: [facility];  Service: Podiatry    KS LAP,CHOLECYSTECTOMY N/A 5/13/2021    Procedure: CHOLECYSTECTOMY LAPAROSCOPIC W/ ROBOTICS; [address] [facility];  Surgeon: Gustavo Gan MD;  Location: [facility];  Service: General    TOE SURGERY Left     TONSILLECTOMY         Family History   Problem Relation Age of Onset    Hypothyroidism Mother     Cancer Father     Prostate cancer Father     Cancer Family     Heart disease Family     Hyperlipidemia Family         pure    No Known Problems Maternal Grandmother     No Known Problems Paternal Grandmother     No Known Problems Maternal Aunt     No Known Problems Paternal Aunt     No Known Problems Paternal Aunt     No Known Problems Paternal Aunt         reports that she has been smoking cigarettes  She has a 16 50 pack-year smoking history  She has never used smokeless tobacco  She reports current alcohol use of about 3 0 standard drinks of alcohol per week  She reports that she does not use drugs      Vitals:    05/28/21 0947   BP: 120/80   Pulse: 82   Temp: (!) 97 4 °F (36 3 °C)       PHYSICAL EXAM  General: normal, cooperative, no distress  Abdominal: soft, nondistended or nontender  Incision: clean, dry, and intact and healing well      Some portions of this record may have been generated with voice recognition software  There may be translation, syntax,  or grammatical errors  Occasional wrong word or "sound-a-like" substitutions may have occurred due to the inherent limitations of the voice recognition software  Read the chart carefully and recognize, using context, where substitutions may have occurred  If you have any questions, please contact the dictating provider for clarification or correction, as needed  This encounter has been coded by a non-certified coder         Felix Pulido PA-C      Date: 5/28/2021 Time: 9:58 AM

## 2022-04-19 ENCOUNTER — TELEPHONE (OUTPATIENT)
Dept: OBGYN CLINIC | Facility: MEDICAL CENTER | Age: 52
End: 2022-04-19

## 2022-05-02 NOTE — PROGRESS NOTES
A/P    1  Annual exam    Last PAP - 4/18/2018- neg neg   Next due 2023   Scheduling of pap discussed in detail      Mammogram - last 9/23/20 #1   Next do slipped    Self breast exams discussed     Colonoscopy - colo guard ordred    2  Previous irregular cycles   Last cycle was 2019 - no longer getting cycle    3  Inability to lose weight   Wants pregnancy test and is negative    TSH      51 y o ,Patient's last menstrual period was 02/17/2020  C/O the weight loss         Past medical / social / surgical / family history reviewed and updated   Medication and allergies discussed in detail and updated     Review of Systems - History obtained from chart review and the patient  General ROS: cant lose weight  Psychological ROS: negative  Ophthalmic ROS: negative  ENT ROS: negative  Allergy and Immunology ROS: negative  Hematological and Lymphatic ROS: negative  Endocrine ROS: negative  Breast ROS: negative for breast lumps  Respiratory ROS: no cough, shortness of breath, or wheezing  Cardiovascular ROS: no chest pain or dyspnea on exertion  Gastrointestinal ROS: no abdominal pain, change in bowel habits, or black or bloody stools  Genito-Urinary ROS: no dysuria, trouble voiding, or hematuria  Musculoskeletal ROS: negative  Neurological ROS: no TIA or stroke symptoms  Dermatological ROS: negative          Physical Exam  Vitals reviewed  Constitutional:       Appearance: She is well-developed  Neck:      Thyroid: No thyromegaly  Cardiovascular:      Rate and Rhythm: Normal rate  Heart sounds: Normal heart sounds  Pulmonary:      Effort: Pulmonary effort is normal  No accessory muscle usage or respiratory distress  Chest:      Chest wall: No tenderness  Breasts: Breasts are symmetrical       Right: No inverted nipple, mass, tenderness or supraclavicular adenopathy  Left: No inverted nipple, mass, tenderness or supraclavicular adenopathy  Abdominal:      General: There is no distension  Palpations: Abdomen is soft  Tenderness: There is no abdominal tenderness  There is no guarding or rebound  Genitourinary:     Exam position: Supine  Labia:         Right: No rash, tenderness, lesion or injury  Left: No rash, tenderness, lesion or injury  Vagina: Normal  No foreign body  No vaginal discharge or bleeding  Cervix: No cervical motion tenderness or discharge  Uterus: Not enlarged and not fixed  Adnexa:         Right: No mass, tenderness or fullness  Left: No mass, tenderness or fullness  Rectum: No external hemorrhoid  Musculoskeletal:      Cervical back: Normal range of motion  Lymphadenopathy:      Cervical: No cervical adenopathy  Upper Body:      Right upper body: No supraclavicular adenopathy  Left upper body: No supraclavicular adenopathy  Neurological:      Mental Status: She is alert and oriented to person, place, and time  Psychiatric:         Speech: Speech normal          Behavior: Behavior normal          Thought Content:  Thought content normal          Judgment: Judgment normal

## 2022-05-03 ENCOUNTER — ANNUAL EXAM (OUTPATIENT)
Dept: OBGYN CLINIC | Facility: MEDICAL CENTER | Age: 52
End: 2022-05-03
Payer: COMMERCIAL

## 2022-05-03 ENCOUNTER — APPOINTMENT (OUTPATIENT)
Dept: LAB | Facility: MEDICAL CENTER | Age: 52
End: 2022-05-03
Payer: COMMERCIAL

## 2022-05-03 VITALS
BODY MASS INDEX: 26.62 KG/M2 | DIASTOLIC BLOOD PRESSURE: 80 MMHG | HEIGHT: 61 IN | SYSTOLIC BLOOD PRESSURE: 110 MMHG | WEIGHT: 141 LBS

## 2022-05-03 DIAGNOSIS — R63.5 WEIGHT GAIN: ICD-10-CM

## 2022-05-03 DIAGNOSIS — Z12.31 SCREENING MAMMOGRAM FOR HIGH-RISK PATIENT: Primary | ICD-10-CM

## 2022-05-03 DIAGNOSIS — Z12.11 SCREENING FOR MALIGNANT NEOPLASM OF COLON: ICD-10-CM

## 2022-05-03 LAB — TSH SERPL DL<=0.05 MIU/L-ACNC: 3.06 UIU/ML (ref 0.45–4.5)

## 2022-05-03 PROCEDURE — 84443 ASSAY THYROID STIM HORMONE: CPT

## 2022-05-03 PROCEDURE — 36415 COLL VENOUS BLD VENIPUNCTURE: CPT

## 2022-05-03 PROCEDURE — 99396 PREV VISIT EST AGE 40-64: CPT | Performed by: OBSTETRICS & GYNECOLOGY

## 2023-01-23 ENCOUNTER — VBI (OUTPATIENT)
Dept: ADMINISTRATIVE | Facility: OTHER | Age: 53
End: 2023-01-23

## 2023-01-23 NOTE — TELEPHONE ENCOUNTER
01/23/23 2:57 PM    The patient was called and a message was left to call the ordering provider's office regarding an open order  Thank you    Perri Diaz  [facility]

## (undated) DEVICE — SCD SEQUENTIAL COMPRESSION COMFORT SLEEVE MEDIUM KNEE LENGTH: Brand: KENDALL SCD

## (undated) DEVICE — SUT PDS II 1 CT-1 27 IN Z347H

## (undated) DEVICE — CHLORAPREP HI-LITE 26ML ORANGE

## (undated) DEVICE — CAST PADDING 4 IN SYNTHETIC NON-STRL

## (undated) DEVICE — TUBE SET SMOKE EVAC PNEUMOCLEAR HIGH FLOW

## (undated) DEVICE — BLADELESS OBTURATOR: Brand: WECK VISTA

## (undated) DEVICE — PLUMEPEN PRO 10FT

## (undated) DEVICE — SUT ETHILON 4-0 PS-2 18 IN 1667H

## (undated) DEVICE — STRETCH BANDAGE: Brand: CURITY

## (undated) DEVICE — PERMANENT CAUTERY HOOK: Brand: ENDOWRIST

## (undated) DEVICE — NEEDLE HYPO 22G X 1-1/2 IN

## (undated) DEVICE — HEM-O-LOK CLIP CARTRIDGE MED/LARGE DA VINCI SI/XI

## (undated) DEVICE — CURITY NON-ADHERENT STRIPS: Brand: CURITY

## (undated) DEVICE — GLOVE SRG BIOGEL ECLIPSE 6.5

## (undated) DEVICE — TELFA NON-ADHERENT ABSORBENT DRESSING: Brand: TELFA

## (undated) DEVICE — COLUMN DRAPE

## (undated) DEVICE — GLOVE INDICATOR PI UNDERGLOVE SZ 6.5 BLUE

## (undated) DEVICE — SPECIMEN CONTAINER STERILE PEEL PACK

## (undated) DEVICE — MEDIUM-LARGE CLIP APPLIER: Brand: ENDOWRIST

## (undated) DEVICE — ARM DRAPE

## (undated) DEVICE — TISSUE RETRIEVAL SYSTEM: Brand: INZII RETRIEVAL SYSTEM

## (undated) DEVICE — 3M™ STERI-STRIP™ COMPOUND BENZOIN TINCTURE 40 BAGS/CARTON 4 CARTONS/CASE C1544: Brand: 3M™ STERI-STRIP™

## (undated) DEVICE — CUFF TOURNIQUET 18 X 4 IN QUICK CONNECT DISP 1 BLADDER

## (undated) DEVICE — TUBING SUCTION 5MM X 12 FT

## (undated) DEVICE — GLOVE SRG BIOGEL 7.5

## (undated) DEVICE — CANNULA SEAL

## (undated) DEVICE — GLOVE INDICATOR PI UNDERGLOVE SZ 7.5 BLUE

## (undated) DEVICE — ALLENTOWN LAP CHOLE APP PACK: Brand: CARDINAL HEALTH

## (undated) DEVICE — MAYO STAND COVER: Brand: CONVERTORS

## (undated) DEVICE — 22.5 MM X 6.9 MM X 0.53 MM SAGITTAL BLADE

## (undated) DEVICE — SUT VICRYL 3-0 PS-2 27 IN J427H

## (undated) DEVICE — PROGRASP FORCEPS: Brand: ENDOWRIST

## (undated) DEVICE — BETHLEHEM UNIVERSAL  MIONR EXT: Brand: CARDINAL HEALTH

## (undated) DEVICE — 3M™ STERI-STRIP™ REINFORCED ADHESIVE SKIN CLOSURES, R1547, 1/2 IN X 4 IN (12 MM X 100 MM), 6 STRIPS/ENVELOPE: Brand: 3M™ STERI-STRIP™

## (undated) DEVICE — NEEDLE 18 G X 1 1/2

## (undated) DEVICE — SYRINGE 5ML LL

## (undated) DEVICE — 2000CC GUARDIAN II: Brand: GUARDIAN

## (undated) DEVICE — NEEDLE 25G X 1 1/2

## (undated) DEVICE — INTENDED FOR TISSUE SEPARATION, AND OTHER PROCEDURES THAT REQUIRE A SHARP SURGICAL BLADE TO PUNCTURE OR CUT.: Brand: BARD-PARKER SAFETY BLADES SIZE 15, STERILE

## (undated) DEVICE — 10FR FRAZIER SUCTION HANDLE: Brand: CARDINAL HEALTH

## (undated) DEVICE — SUT VICRYL 4-0 PS-2 27 IN J426H

## (undated) DEVICE — SUT MONOCRYL 4-0 PS-2 27 IN Y426H

## (undated) DEVICE — GLOVE SRG BIOGEL 6.5

## (undated) DEVICE — STOCKINETTE REGULAR

## (undated) DEVICE — INTENDED FOR TISSUE SEPARATION, AND OTHER PROCEDURES THAT REQUIRE A SHARP SURGICAL BLADE TO PUNCTURE OR CUT.: Brand: BARD-PARKER ® CARBON RIB-BACK BLADES

## (undated) DEVICE — ASTOUND STANDARD SURGICAL GOWN, XL: Brand: CONVERTORS

## (undated) DEVICE — PMI DISPOSABLE PUNCTURE CLOSURE DEVICE / SUTURE GRASPER: Brand: PMI

## (undated) DEVICE — TROCAR: Brand: KII FIOS FIRST ENTRY